# Patient Record
Sex: MALE | Race: WHITE | NOT HISPANIC OR LATINO | Employment: OTHER | URBAN - METROPOLITAN AREA
[De-identification: names, ages, dates, MRNs, and addresses within clinical notes are randomized per-mention and may not be internally consistent; named-entity substitution may affect disease eponyms.]

---

## 2017-06-02 ENCOUNTER — TELEPHONE (OUTPATIENT)
Dept: HEPATOLOGY | Facility: CLINIC | Age: 52
End: 2017-06-02

## 2017-06-02 DIAGNOSIS — B18.2 CHRONIC HEPATITIS C WITHOUT HEPATIC COMA: Primary | ICD-10-CM

## 2017-06-02 DIAGNOSIS — K74.69 OTHER CIRRHOSIS OF LIVER: ICD-10-CM

## 2017-06-02 NOTE — TELEPHONE ENCOUNTER
----- Message from Le Lopez sent at 6/2/2017 11:39 AM CDT -----  Contact: patient   Calling to schedule appt please call  [phone]

## 2017-06-02 NOTE — TELEPHONE ENCOUNTER
Chart reviewed.  He was treated with solvaldi and ribavirin x12 weeks, completed treatment on 9/30/14. Pt relapsed. Pt was last seen by Karen on 10/25/15. He is overdue for HCC screening and f/u.    Several attempts were made to reach pt last year (he was identified on HCV registry).   Pt called today requesting appt for Hep C.   Genotype, cbc, cmp, inr, afp scheduled on 6/5. U/s, fibroscan, and clinic visit scheduled on 6/23. Reminders mailed.

## 2017-06-07 ENCOUNTER — TELEPHONE (OUTPATIENT)
Dept: HEPATOLOGY | Facility: CLINIC | Age: 52
End: 2017-06-07

## 2017-06-07 NOTE — TELEPHONE ENCOUNTER
Pt did not have labs done on 6/5 as scheduled. Attempted to speak with him. Left messages requesting pt call back to reschedule asap.

## 2017-06-14 ENCOUNTER — LAB VISIT (OUTPATIENT)
Dept: LAB | Facility: HOSPITAL | Age: 52
End: 2017-06-14
Attending: INTERNAL MEDICINE
Payer: COMMERCIAL

## 2017-06-14 DIAGNOSIS — E11.9 TYPE 2 DIABETES MELLITUS WITHOUT COMPLICATION: ICD-10-CM

## 2017-06-14 DIAGNOSIS — B18.2 CHRONIC HEPATITIS C WITHOUT HEPATIC COMA: ICD-10-CM

## 2017-06-14 DIAGNOSIS — K74.69 OTHER CIRRHOSIS OF LIVER: ICD-10-CM

## 2017-06-14 LAB
AFP SERPL-MCNC: 4.2 NG/ML
ALBUMIN SERPL BCP-MCNC: 3.5 G/DL
ALP SERPL-CCNC: 110 U/L
ALT SERPL W/O P-5'-P-CCNC: 83 U/L
ANION GAP SERPL CALC-SCNC: 8 MMOL/L
AST SERPL-CCNC: 53 U/L
BASOPHILS # BLD AUTO: 0.04 K/UL
BASOPHILS NFR BLD: 0.5 %
BILIRUB DIRECT SERPL-MCNC: 0.3 MG/DL
BILIRUB SERPL-MCNC: 0.5 MG/DL
BUN SERPL-MCNC: 13 MG/DL
CALCIUM SERPL-MCNC: 9.2 MG/DL
CHLORIDE SERPL-SCNC: 102 MMOL/L
CO2 SERPL-SCNC: 27 MMOL/L
CREAT SERPL-MCNC: 0.8 MG/DL
DIFFERENTIAL METHOD: NORMAL
EOSINOPHIL # BLD AUTO: 0.2 K/UL
EOSINOPHIL NFR BLD: 2.6 %
ERYTHROCYTE [DISTWIDTH] IN BLOOD BY AUTOMATED COUNT: 12.4 %
EST. GFR  (AFRICAN AMERICAN): >60 ML/MIN/1.73 M^2
EST. GFR  (NON AFRICAN AMERICAN): >60 ML/MIN/1.73 M^2
ESTIMATED AVG GLUCOSE: 306 MG/DL
GGT SERPL-CCNC: 100 U/L
GLUCOSE SERPL-MCNC: 337 MG/DL
HAPTOGLOB SERPL-MCNC: 69 MG/DL
HBA1C MFR BLD HPLC: 12.3 %
HCT VFR BLD AUTO: 43.2 %
HGB BLD-MCNC: 14.8 G/DL
INR PPP: 1.2
LYMPHOCYTES # BLD AUTO: 2.3 K/UL
LYMPHOCYTES NFR BLD: 26.3 %
MCH RBC QN AUTO: 30.4 PG
MCHC RBC AUTO-ENTMCNC: 34.3 %
MCV RBC AUTO: 89 FL
MONOCYTES # BLD AUTO: 0.9 K/UL
MONOCYTES NFR BLD: 9.8 %
NEUTROPHILS # BLD AUTO: 5.3 K/UL
NEUTROPHILS NFR BLD: 60.3 %
PLATELET # BLD AUTO: 160 K/UL
PMV BLD AUTO: 11.9 FL
POTASSIUM SERPL-SCNC: 4.4 MMOL/L
PROT SERPL-MCNC: 6.9 G/DL
PROTHROMBIN TIME: 12.4 SEC
RBC # BLD AUTO: 4.87 M/UL
SODIUM SERPL-SCNC: 137 MMOL/L
WBC # BLD AUTO: 8.75 K/UL

## 2017-06-14 PROCEDURE — 82977 ASSAY OF GGT: CPT

## 2017-06-14 PROCEDURE — 87902 NFCT AGT GNTYP ALYS HEP C: CPT

## 2017-06-14 PROCEDURE — 80053 COMPREHEN METABOLIC PANEL: CPT

## 2017-06-14 PROCEDURE — 85610 PROTHROMBIN TIME: CPT

## 2017-06-14 PROCEDURE — 82248 BILIRUBIN DIRECT: CPT

## 2017-06-14 PROCEDURE — 83036 HEMOGLOBIN GLYCOSYLATED A1C: CPT

## 2017-06-14 PROCEDURE — 87522 HEPATITIS C REVRS TRNSCRPJ: CPT

## 2017-06-14 PROCEDURE — 82105 ALPHA-FETOPROTEIN SERUM: CPT

## 2017-06-14 PROCEDURE — 83010 ASSAY OF HAPTOGLOBIN QUANT: CPT

## 2017-06-14 PROCEDURE — 85025 COMPLETE CBC W/AUTO DIFF WBC: CPT

## 2017-06-16 ENCOUNTER — TELEPHONE (OUTPATIENT)
Dept: HEPATOLOGY | Facility: CLINIC | Age: 52
End: 2017-06-16

## 2017-06-16 LAB
HCV GENTYP SERPL NAA+PROBE: ABNORMAL
HCV QUALITATIVE RESULT: DETECTED
HCV QUANTITATIVE LOG: 6.11 LOG (10) IU/ML
HCV RNA SPEC NAA+PROBE-ACNC: ABNORMAL IU/ML

## 2017-06-19 ENCOUNTER — OFFICE VISIT (OUTPATIENT)
Dept: INTERNAL MEDICINE | Facility: CLINIC | Age: 52
End: 2017-06-19
Payer: COMMERCIAL

## 2017-06-19 VITALS
WEIGHT: 187.31 LBS | HEIGHT: 71 IN | DIASTOLIC BLOOD PRESSURE: 84 MMHG | SYSTOLIC BLOOD PRESSURE: 120 MMHG | HEART RATE: 89 BPM | BODY MASS INDEX: 26.22 KG/M2

## 2017-06-19 DIAGNOSIS — Z12.5 SPECIAL SCREENING FOR MALIGNANT NEOPLASM OF PROSTATE: ICD-10-CM

## 2017-06-19 DIAGNOSIS — R35.0 URINARY FREQUENCY: ICD-10-CM

## 2017-06-19 DIAGNOSIS — E11.65 UNCONTROLLED TYPE 2 DIABETES MELLITUS WITH HYPERGLYCEMIA, WITH LONG-TERM CURRENT USE OF INSULIN: ICD-10-CM

## 2017-06-19 DIAGNOSIS — Z00.00 HEALTH CARE MAINTENANCE: Primary | ICD-10-CM

## 2017-06-19 DIAGNOSIS — Z97.3 WEARS GLASSES: ICD-10-CM

## 2017-06-19 DIAGNOSIS — Z79.4 UNCONTROLLED TYPE 2 DIABETES MELLITUS WITH HYPERGLYCEMIA, WITH LONG-TERM CURRENT USE OF INSULIN: ICD-10-CM

## 2017-06-19 DIAGNOSIS — K63.5 POLYP OF COLON, UNSPECIFIED PART OF COLON, UNSPECIFIED TYPE: ICD-10-CM

## 2017-06-19 LAB
BACTERIA #/AREA URNS AUTO: NORMAL /HPF
BILIRUB UR QL STRIP: NEGATIVE
CLARITY UR REFRACT.AUTO: CLEAR
COLOR UR AUTO: YELLOW
CREAT UR-MCNC: 36 MG/DL
GLUCOSE UR QL STRIP: ABNORMAL
HGB UR QL STRIP: NEGATIVE
KETONES UR QL STRIP: NEGATIVE
LEUKOCYTE ESTERASE UR QL STRIP: NEGATIVE
MICROALBUMIN UR DL<=1MG/L-MCNC: <2.5 UG/ML
MICROALBUMIN/CREATININE RATIO: NORMAL UG/MG
MICROSCOPIC COMMENT: NORMAL
NITRITE UR QL STRIP: NEGATIVE
PH UR STRIP: 7 [PH] (ref 5–8)
PROT UR QL STRIP: NEGATIVE
SP GR UR STRIP: 1.03 (ref 1–1.03)
URN SPEC COLLECT METH UR: ABNORMAL
UROBILINOGEN UR STRIP-ACNC: NEGATIVE EU/DL
WBC #/AREA URNS AUTO: 0 /HPF (ref 0–5)
YEAST UR QL AUTO: NORMAL

## 2017-06-19 PROCEDURE — 82570 ASSAY OF URINE CREATININE: CPT

## 2017-06-19 PROCEDURE — 99999 PR PBB SHADOW E&M-EST. PATIENT-LVL V: CPT | Mod: PBBFAC,,, | Performed by: INTERNAL MEDICINE

## 2017-06-19 PROCEDURE — 87086 URINE CULTURE/COLONY COUNT: CPT

## 2017-06-19 PROCEDURE — 99396 PREV VISIT EST AGE 40-64: CPT | Mod: S$GLB,,, | Performed by: INTERNAL MEDICINE

## 2017-06-19 PROCEDURE — 81001 URINALYSIS AUTO W/SCOPE: CPT

## 2017-06-19 RX ORDER — INSULIN GLARGINE 100 [IU]/ML
15 INJECTION, SOLUTION SUBCUTANEOUS DAILY
Qty: 5 ML | Refills: 6 | Status: SHIPPED | OUTPATIENT
Start: 2017-06-19 | End: 2017-09-19 | Stop reason: SDUPTHER

## 2017-06-19 NOTE — PROGRESS NOTES
"Subjective:       Patient ID: Nicholas Marshall is a 52 y.o. male.    Chief Complaint: Annual Exam    HPI   51 yo M last seen by me in 2015 presents requesting annual exam (scheduled 20 min.)    DM - a1c 12.3% earlier this month.    Failed metformin previously.   Previously saw NP Alessandro but not since 2015.   Down to 187 lbs from 310#.   Previous trouble affording lantus.   Previous:  Lantus 25 units twice daily, Humalog 18 units plus correction with goal 180, ISF 25.   Still having quite high readings.  He has been having trouble affording the Lantus.  No insulin filled here since 2015.   Reports yesterday was 208.  He is active, riding bike. Has done well with weight loss.      He is rarely taking lantus - reports on sliding scare? He reports he has not taken it in about 10 days. It was last 15u daily.   humalog on sliding scale.   HCV VL 1.2mil. Dr. Charles.       Review of Systems   Constitutional: Negative for fever.   Respiratory: Negative for shortness of breath.    Cardiovascular: Negative for chest pain.   Genitourinary: Positive for dysuria and frequency.   Musculoskeletal: Negative.    Skin: Negative.        Objective:   /84   Pulse 89   Ht 5' 11" (1.803 m)   Wt 85 kg (187 lb 4.8 oz)   BMI 26.12 kg/m²      Physical Exam   Constitutional: He is oriented to person, place, and time. He appears well-developed and well-nourished. No distress.   HENT:   Head: Normocephalic and atraumatic.   Cardiovascular: Normal rate and regular rhythm.    No murmur heard.  Pulmonary/Chest: Effort normal. No respiratory distress. He has no wheezes. He has no rales.   Neurological: He is alert and oriented to person, place, and time.   Skin: Skin is warm and dry. He is not diaphoretic.   tattoos   Psychiatric: He has a normal mood and affect. His behavior is normal.     Protective Sensation (w/ 10 gram monofilament):  Right: Intact  Left: Intact    Visual Inspection:  Normal -  Bilateral x left great toe " ingrown nail that has been cut, healing well    Pedal Pulses:   Right: Present  Left: Present    Posterior tibialis:   Right:Present  Left: Present      Assessment:       1. Health care maintenance    2. Uncontrolled diabetes mellitus type 2 without complications, unspecified long term insulin use status    3. Wears glasses    4. Urinary frequency    5. Special screening for malignant neoplasm of prostate    6. Polyp of colon, unspecified part of colon, unspecified type    7. Uncontrolled type 2 diabetes mellitus with hyperglycemia, with long-term current use of insulin        Plan:       Nicholas was seen today for annual exam.    Diagnoses and all orders for this visit:    Health care maintenance  -     Case request GI: COLONOSCOPY    Uncontrolled type 2 diabetes mellitus with hyperglycemia, with long-term current use of insulin  -     blood sugar diagnostic (BLOOD GLUCOSE TEST) Strp; Pt test BG 4x/day  -     RESTART insulin glargine (LANTUS SOLOSTAR) 100 unit/mL (3 mL) InPn pen; Inject 15 Units into the skin once daily.  Continue humalog ssi ac tid  Bring bg log and insulin doses to upcoming endocrine appt  -     acetone, urine, test (KETONE URINE TEST) Strp; 1 strip by Misc.(Non-Drug; Combo Route) route daily as needed (high blood sugar).  -     Ambulatory Referral to Pharmacy Assistance  -     Microalbumin/creatinine urine ratio  -     Ambulatory referral to Optometry  -     Lipid panel; Future    Wears glasses  -     Ambulatory referral to Optometry    Urinary frequency  -     Urinalysis  -     Urine culture    Special screening for malignant neoplasm of prostate  -     PSA, Screening; Future    Polyp of colon, unspecified part of colon, unspecified type  -     Case request GI: COLONOSCOPY          eye exam Jan 2017 at [facility] - wants to re-establish here

## 2017-06-20 ENCOUNTER — TELEPHONE (OUTPATIENT)
Dept: PULMONOLOGY | Facility: CLINIC | Age: 52
End: 2017-06-20

## 2017-06-21 LAB — BACTERIA UR CULT: NO GROWTH

## 2017-06-23 ENCOUNTER — CLINICAL SUPPORT (OUTPATIENT)
Dept: INFECTIOUS DISEASES | Facility: CLINIC | Age: 52
End: 2017-06-23
Payer: COMMERCIAL

## 2017-06-23 ENCOUNTER — HOSPITAL ENCOUNTER (OUTPATIENT)
Dept: RADIOLOGY | Facility: HOSPITAL | Age: 52
Discharge: HOME | End: 2017-06-23
Attending: INTERNAL MEDICINE
Payer: COMMERCIAL

## 2017-06-23 ENCOUNTER — OFFICE VISIT (OUTPATIENT)
Dept: HEPATOLOGY | Facility: CLINIC | Age: 52
End: 2017-06-23
Payer: COMMERCIAL

## 2017-06-23 VITALS
DIASTOLIC BLOOD PRESSURE: 96 MMHG | HEART RATE: 97 BPM | TEMPERATURE: 97 F | SYSTOLIC BLOOD PRESSURE: 135 MMHG | WEIGHT: 188.94 LBS | HEIGHT: 71 IN | BODY MASS INDEX: 26.45 KG/M2 | RESPIRATION RATE: 18 BRPM | OXYGEN SATURATION: 95 %

## 2017-06-23 DIAGNOSIS — B18.2 CHRONIC HEPATITIS C WITHOUT HEPATIC COMA: ICD-10-CM

## 2017-06-23 DIAGNOSIS — B18.2 CHRONIC HEPATITIS C WITHOUT HEPATIC COMA: Primary | ICD-10-CM

## 2017-06-23 DIAGNOSIS — K74.69 OTHER CIRRHOSIS OF LIVER: ICD-10-CM

## 2017-06-23 PROBLEM — K74.60 HEPATIC CIRRHOSIS: Status: ACTIVE | Noted: 2017-06-23

## 2017-06-23 PROCEDURE — 90471 IMMUNIZATION ADMIN: CPT | Mod: S$GLB,,, | Performed by: INTERNAL MEDICINE

## 2017-06-23 PROCEDURE — 76700 US EXAM ABDOM COMPLETE: CPT | Mod: TC

## 2017-06-23 PROCEDURE — 76700 US EXAM ABDOM COMPLETE: CPT | Mod: 26,,, | Performed by: RADIOLOGY

## 2017-06-23 PROCEDURE — 99214 OFFICE O/P EST MOD 30 MIN: CPT | Mod: S$GLB,,, | Performed by: PHYSICIAN ASSISTANT

## 2017-06-23 PROCEDURE — 99999 PR PBB SHADOW E&M-EST. PATIENT-LVL IV: CPT | Mod: PBBFAC,,, | Performed by: PHYSICIAN ASSISTANT

## 2017-06-23 PROCEDURE — 99999 PR PBB SHADOW E&M-EST. PATIENT-LVL I: CPT | Mod: PBBFAC,,,

## 2017-06-23 PROCEDURE — 90636 HEP A/HEP B VACC ADULT IM: CPT | Mod: S$GLB,,, | Performed by: INTERNAL MEDICINE

## 2017-06-23 NOTE — LETTER
June 23, 2017      Karen Valencia, NP  [address]  [facility] [address]           [facility]  [address]  [facility] [address]  Phone: [phone]  Fax: [phone]          Patient: Nicholas Marshall   MR Number: [id number]   YOB: 1965   Date of Visit: 6/23/2017       Dear Karen Valencia:    Thank you for referring Nicholas Marshall to me for evaluation. Attached you will find relevant portions of my assessment and plan of care.    If you have questions, please do not hesitate to call me. I look forward to following Nicholas Marshall along with you.    Sincerely,    CHANDLER Maldonadoosure  CC:  No Recipients    If you would like to receive this communication electronically, please contact [email or web address] or [phone] to request more information on [employer] access.    For providers and/or their staff who would like to refer a patient to [facility], please contact us through our one-stop-shop provider referral line, [facility], at [phone].    If you feel you have received this communication in error or would no longer like to receive these types of communications, please e-mail [email or web address]

## 2017-06-23 NOTE — PROGRESS NOTES
HEPATOLOGY CLINIC VISIT NOTE    REFERRING PROVIDER:    CHIEF COMPLAINT: Hepatitis C    HISTORY: Patient is a 52 y.o. WM who was identified on the HCV registry.  He is new to me.  Multiple attempts were made to reach him last year by staff, as was also overdue for HCC screening.  Just reastablished with PCP here.   Previously followed by Karen Guardado NP, but last seen .   He has chronic hepatitis C Genotype 2a/c w/ viral load of 1.3million IU/mL and well compensated cirrhosis.  He was treated w/ 12 weeks of  sovaldi / Ribavirin but unfortunately relapsed back late . Recent labs show normal cbc w/ platelets 160K, Transaminases elevated and fairly well preserved synthetic liver function. MELD = 8 based on 2017 labs    hcc screen:   AFP normal at 4.2  2017 Abd US = hepatosplenomegaly.  No steatosis, cholecstectomy, simple right renal cyst (stable). Spleen = 15.1cm    No EGD noted in epic    Stagin-2013 HCVFibrosure = F4 Cirrhosis   Splenomegaly on imaging     Past hcv treatment:   He reports he was compliant w/ treatment in the past, denies recalling any significant side effects and tolerated quite well.   Completed 12 weeks sof + rbv late .       Pt reports significant  wt loss, went from 310 to 185 pounds 1.5 years.   Appetite down. Seeing PCP and has started workup for this.     Pt has no other complaints today and feels well he denies fevers,  lymphadenopathy,  Rashes, dark urine, abdominal fluid accumulation, yellowing/jaundice of skin or eyes, vomiting of blood, black stool, confusion or  tremors.       Past Medical History:   Diagnosis Date    Diabetes mellitus     Diabetes mellitus type 2 in nonobese 2013    Hepatitis C virus infection 2013    Hypertension     Obesity 2013    Intentional 75 ib weight loss    Splenomegaly      Social history:    . [employer] around     FHX:   No fhx of liver cancer or liver disease.     ALLERGIES AND  MEDICATIONS: reviewed in epic    ROS:   General: denies fever, chills, weight change, fatigue   Skin: denies rash, itching, sores  HEENT: + headaches, + h/o migraines   Respiratory: denies SOB, wheeze, cough, sputum, hemoptysis  CV: denies chest pain or palpatations  GI: no heartburn, no nausea, vomiting, diarrhea,  hematechezia, jaundice,   hematemesis,  denies icteric illness, abdominal swelling, confusion.   : denies dark urine  Musculoskeletal: denies muscle weakness, + joint pains.     PE:  WDWN, NAD  Alert, oriented and pleasant.   Vitals:    06/23/17 0915   BP: (!) 135/96   Pulse: 97   Resp: 18   Temp: 96.7 °F (35.9 °C)   HEENT: ncat, eomi, no scleral icterus; normal rom of neck, no lymphadenopathy  Heart: regular rate and rhythm  Lungs: clear bilaterally, chest symmetric with respirations. No wheezes rhonchi or rales.   Abdomen: + bs, ntnd. No organomegaly. No masses palpable   Neuro/psych: no asterixis, oriented x3, mood and affect appropriate.   Skin: warm and dry, no c/c/e. +  ordaz erythema.  +  spider telangectasia on chest     RECENT LABS:  Results for DENNIS MARR (MRN [id number]) as of 6/23/2017 10:28   Ref. Range 6/14/2017 07:10   HCV Qualitative Result Latest Ref Range: Not detected  DETECTED (A)   HCV Quantitative Log Latest Ref Range: <1.08 Log (10) IU/mL 6.11 (H)   HCV Quantitative Result Latest Ref Range: <12 IU/mL 1,295,339 (H)   Hepatitis C Virus Genotype Unknown 2a/c (A)       Hepatitis A Antibody IgG   Date Value Ref Range Status   09/16/2013 Negative  Final       Hepatitis B Surface Ag   Date Value Ref Range Status   09/16/2013 Negative  Final     Hep B Core Total Ab   Date Value Ref Range Status   09/16/2013 Negative  Final     Hep B S Ab   Date Value Ref Range Status   09/16/2013 Negative  Final     Immunization History   Administered Date(s) Administered    Influenza Split 01/22/2014    Pneumococcal Polysaccharide - 23 Valent 01/22/2014    influenza - Quadrivalent  12/07/2015     Lab Results   Component Value Date    WBC 8.75 06/14/2017    HGB 14.8 06/14/2017     06/14/2017    AST 53 (H) 06/14/2017    ALT 83 (H) 06/14/2017    CREATININE 0.8 06/14/2017    BILITOT 0.5 06/14/2017    INR 1.2 06/14/2017    AFP 4.2 06/14/2017     MELD-Na score: 8 at 6/14/2017  7:10 AM  MELD score: 8 at 6/14/2017  7:10 AM  Calculated from:  Serum Creatinine: 0.8 mg/dL (Rounded to 1) at 6/14/2017  7:10 AM  Serum Sodium: 137 mmol/L at 6/14/2017  7:10 AM  Total Bilirubin: 0.5 mg/dL (Rounded to 1) at 6/14/2017  7:10 AM  INR(ratio): 1.2 at 6/14/2017  7:10 AM  Age: 52 years    ASSESSMENT:  51yo WM w/   1. CHRONIC HEPATITIS C, GENOTYPE 2a/c  Relapsed 12 wks of sovaldi and ribavirin (therapy completed )   elevated transaminases.    Lacks immunity to Hepatitis A and B.    2. Cirrhosis, well compensated  2013 HCVFibrosure = F4 Cirrhosis  Splenomegaly  MELD = 8  6-2017 US = no liver lesions, AFP normal   Had a lapse in hcc screening but now up to date (6-2017)                    EDUCATION DISCUSSION    Per current AASLD for   Genotype 2 Sofosbuvir plus Ribavirin Treatment-Experienced Patients - Recommended  Recommended regimens are listed in groups by level of evidence, then alphabetically.     Daily daclatasvir (60 mg*) plus sofosbuvir (400 mg) with or without weight-based ribavirin for 24 weeks is a Recommended regimen for patients with HCV genotype 2 infection, regardless of cirrhosis status, in whom prior treatment with sofosbuvir and ribavirin has failed.  Rating: Class IIa, Level C     Daily fixed-dose combination of sofosbuvir (400 mg)/velpatasvir (100 mg) with weight-based ribavirin for 12 weeks is a Recommended regimen for patients with HCV genotype 2 infection, regardless of cirrhosis status, in whom prior treatment with sofosbuvir and ribavirin has failed.  Rating: Class IIa, Level C    re few data available to guide therapy in patients with HCV genotype 2 infection in whom prior  treatment with sofosbuvir and ribavirin has failed. Prior studies of genotype 1 or 3 treatment failures have shown that adding ribavirin to sofosbuvir/velpatasvir leads to higher cure rates than just sofosbuvir/velpatasvir alone (Nohemi, 2015).   Extrapolating from this study, the addition of ribavirin is recommended.    The combination of daclatasvir and sofosbuvir is effective in patients with HCV genotype 2 infection, but there are limited data about this therapy in treatment-experienced patients with HCV genotype 2 infection (Farzaneh, 2014a); (Carl, 2015). For patients in whom prior treatment with sofosbuvir and ribavirin failed who are ribavirin ineligible, the decision to treat with daclatasvir and sofosbuvir should be made on an individual patient basis with consideration of extension of therapy to 24 weeks with the addition of ribavirin, especially in difficult-to-treat patients such as those with cirrhosis.    Reviewed the information above and limited data on current regimens for his retreatment, i do recommend he consider the options.  Given that his cirrhosis is well compensated i think  he would do well with retreatment with either above regimen. However  i can not give exact SVR rates.   He's reluctant to proceed.     Reviewed the basics about liver fibrosis /scarring and how that relates to liver function.   Signs and symptoms of hepatic decompensation were reviewed, including jaundice, ascites, and slowed mentation due to hepatic encephalopathy. The patient should seek medical attention if any of these things occur.   We discussed the potential for bleeding from esophageal varices with symptoms of hematemesis and melena.   The patient should report to the Emergency Department for these symptoms.     We discussed the increased risk of hepatocellular carcinoma due to cirrhosis.   Continued screening every six months with ultrasound and AFP is recommended.   Discussed recommendation for varices  screening w/ EGD.   Discussed the general recommendations for liver health-- continue to avoid raw seafood, limit tylenol to 2000mg daily and avoid alcohol     PLAN:  Vaccines for Hepatitis A and B  Recommended  Continue HCC screening every 6 months lifelong    Recommend EGD, discussed rational , ordered    Continue f/u with PCP re: wt loss   If reconsiders retreatment w/ Epclusa + RBV, pls call for appt sooner. Or we can weigh against new options we may have in 6 months.   F/U appt: 6 months w/ US & labs prior to visit

## 2017-06-28 ENCOUNTER — TELEPHONE (OUTPATIENT)
Dept: INTERNAL MEDICINE | Facility: CLINIC | Age: 52
End: 2017-06-28

## 2017-06-28 DIAGNOSIS — E78.5 HYPERLIPIDEMIA ASSOCIATED WITH TYPE 2 DIABETES MELLITUS: ICD-10-CM

## 2017-06-28 DIAGNOSIS — E11.69 HYPERLIPIDEMIA ASSOCIATED WITH TYPE 2 DIABETES MELLITUS: ICD-10-CM

## 2017-06-28 NOTE — TELEPHONE ENCOUNTER
Please call pt.     Your cholesterol levels are above goal.  Goal LDL (bad cholesterol) is under 100.     I would like you to work on your diet. Try to minimize high cholesterol foods such as red meats, egg yolks, and fried foods.     We will recheck your cholesterol in 3 months and if your levels are still at that point we can talk about starting a medication. If you would like to start medication now please let me know.     Schedule fasting labs in three months.

## 2017-06-29 NOTE — TELEPHONE ENCOUNTER
Attempted to contact pt to give results, cell not working left message with sister to call the office back.

## 2017-06-30 RX ORDER — LANCETS
EACH MISCELLANEOUS
Qty: 150 EACH | Refills: 4 | Status: SHIPPED | OUTPATIENT
Start: 2017-06-30

## 2017-07-13 NOTE — TELEPHONE ENCOUNTER
Per [employer]- Pt would like to have strips filled through their mail order pharmacy.     Refill request pended to Dr. Ortiz for authorization.

## 2017-07-31 ENCOUNTER — CLINICAL SUPPORT (OUTPATIENT)
Dept: INFECTIOUS DISEASES | Facility: CLINIC | Age: 52
End: 2017-07-31
Payer: COMMERCIAL

## 2017-07-31 DIAGNOSIS — K74.69 OTHER CIRRHOSIS OF LIVER: ICD-10-CM

## 2017-07-31 DIAGNOSIS — B18.2 CHRONIC HEPATITIS C WITHOUT HEPATIC COMA: ICD-10-CM

## 2017-07-31 PROCEDURE — 90636 HEP A/HEP B VACC ADULT IM: CPT | Mod: S$GLB,,, | Performed by: INTERNAL MEDICINE

## 2017-07-31 PROCEDURE — 90471 IMMUNIZATION ADMIN: CPT | Mod: S$GLB,,, | Performed by: INTERNAL MEDICINE

## 2017-07-31 PROCEDURE — 99999 PR PBB SHADOW E&M-EST. PATIENT-LVL I: CPT | Mod: PBBFAC,,,

## 2017-09-05 ENCOUNTER — TELEPHONE (OUTPATIENT)
Dept: HEPATOLOGY | Facility: CLINIC | Age: 52
End: 2017-09-05

## 2017-09-05 NOTE — TELEPHONE ENCOUNTER
----- Message from Tara Coello MA sent at 9/5/2017  2:27 PM CDT -----  Contact: Pt      ----- Message -----  From: Monica Wan  Sent: 9/5/2017  11:45 AM  To: [facility] Hepat Non-Clinical Staff    Pt would like to schedule labs, ultrasound & an appt with Evelia Cherry for treatment follow up    Contact number [phone] if pt doesn't answer you can leave a message with his sister Tarsha    Ingris

## 2017-09-13 ENCOUNTER — HOSPITAL ENCOUNTER (INPATIENT)
Facility: HOSPITAL | Age: 52
LOS: 4 days | Discharge: HOME | DRG: 345 | End: 2017-09-17
Attending: EMERGENCY MEDICINE | Admitting: INTERNAL MEDICINE
Payer: COMMERCIAL

## 2017-09-13 DIAGNOSIS — K61.1 RECTAL ABSCESS: Primary | ICD-10-CM

## 2017-09-13 DIAGNOSIS — B18.2 CHRONIC HEPATITIS C WITHOUT HEPATIC COMA: ICD-10-CM

## 2017-09-13 LAB
ALBUMIN SERPL BCP-MCNC: 3.4 G/DL
ALP SERPL-CCNC: 97 U/L
ALT SERPL W/O P-5'-P-CCNC: 142 U/L
ANION GAP SERPL CALC-SCNC: 9 MMOL/L
AST SERPL-CCNC: 92 U/L
BACTERIA #/AREA URNS HPF: NORMAL /HPF
BASOPHILS # BLD AUTO: 0.02 K/UL
BASOPHILS NFR BLD: 0.2 %
BILIRUB SERPL-MCNC: 1 MG/DL
BILIRUB UR QL STRIP: NEGATIVE
BUN SERPL-MCNC: 7 MG/DL
CALCIUM SERPL-MCNC: 9.3 MG/DL
CHLORIDE SERPL-SCNC: 102 MMOL/L
CLARITY UR: CLEAR
CO2 SERPL-SCNC: 26 MMOL/L
COLOR UR: YELLOW
CREAT SERPL-MCNC: 0.9 MG/DL
DIFFERENTIAL METHOD: ABNORMAL
EOSINOPHIL # BLD AUTO: 0.1 K/UL
EOSINOPHIL NFR BLD: 0.9 %
ERYTHROCYTE [DISTWIDTH] IN BLOOD BY AUTOMATED COUNT: 12.5 %
EST. GFR  (AFRICAN AMERICAN): >60 ML/MIN/1.73 M^2
EST. GFR  (NON AFRICAN AMERICAN): >60 ML/MIN/1.73 M^2
GLUCOSE SERPL-MCNC: 328 MG/DL
GLUCOSE UR QL STRIP: ABNORMAL
HCT VFR BLD AUTO: 41.2 %
HGB BLD-MCNC: 14.5 G/DL
HGB UR QL STRIP: NEGATIVE
KETONES UR QL STRIP: NEGATIVE
LACTATE SERPL-SCNC: 0.9 MMOL/L
LEUKOCYTE ESTERASE UR QL STRIP: NEGATIVE
LYMPHOCYTES # BLD AUTO: 1.9 K/UL
LYMPHOCYTES NFR BLD: 17.8 %
MCH RBC QN AUTO: 30.7 PG
MCHC RBC AUTO-ENTMCNC: 35.2 G/DL
MCV RBC AUTO: 87 FL
MICROSCOPIC COMMENT: NORMAL
MONOCYTES # BLD AUTO: 1 K/UL
MONOCYTES NFR BLD: 9.5 %
NEUTROPHILS # BLD AUTO: 7.7 K/UL
NEUTROPHILS NFR BLD: 71.2 %
NITRITE UR QL STRIP: NEGATIVE
PH UR STRIP: 6 [PH] (ref 5–8)
PLATELET # BLD AUTO: 178 K/UL
PMV BLD AUTO: 11.4 FL
POCT GLUCOSE: 267 MG/DL (ref 70–110)
POCT GLUCOSE: 304 MG/DL (ref 70–110)
POTASSIUM SERPL-SCNC: 3.6 MMOL/L
PROT SERPL-MCNC: 7.1 G/DL
PROT UR QL STRIP: NEGATIVE
RBC # BLD AUTO: 4.73 M/UL
SODIUM SERPL-SCNC: 137 MMOL/L
SP GR UR STRIP: >1.03 (ref 1–1.03)
URN SPEC COLLECT METH UR: ABNORMAL
UROBILINOGEN UR STRIP-ACNC: NEGATIVE EU/DL
WBC # BLD AUTO: 10.78 K/UL
YEAST URNS QL MICRO: NORMAL

## 2017-09-13 PROCEDURE — 63600175 PHARM REV CODE 636 W HCPCS: Performed by: PHYSICIAN ASSISTANT

## 2017-09-13 PROCEDURE — 96375 TX/PRO/DX INJ NEW DRUG ADDON: CPT

## 2017-09-13 PROCEDURE — 12000002 HC ACUTE/MED SURGE SEMI-PRIVATE ROOM

## 2017-09-13 PROCEDURE — 82962 GLUCOSE BLOOD TEST: CPT

## 2017-09-13 PROCEDURE — 85025 COMPLETE CBC W/AUTO DIFF WBC: CPT

## 2017-09-13 PROCEDURE — 96376 TX/PRO/DX INJ SAME DRUG ADON: CPT

## 2017-09-13 PROCEDURE — 80053 COMPREHEN METABOLIC PANEL: CPT

## 2017-09-13 PROCEDURE — 25500020 PHARM REV CODE 255: Performed by: EMERGENCY MEDICINE

## 2017-09-13 PROCEDURE — 25000003 PHARM REV CODE 250: Performed by: PHYSICIAN ASSISTANT

## 2017-09-13 PROCEDURE — 83605 ASSAY OF LACTIC ACID: CPT

## 2017-09-13 PROCEDURE — S0028 INJECTION, FAMOTIDINE, 20 MG: HCPCS | Performed by: PHYSICIAN ASSISTANT

## 2017-09-13 PROCEDURE — 96374 THER/PROPH/DIAG INJ IV PUSH: CPT

## 2017-09-13 PROCEDURE — 87040 BLOOD CULTURE FOR BACTERIA: CPT

## 2017-09-13 PROCEDURE — 99285 EMERGENCY DEPT VISIT HI MDM: CPT | Mod: 25

## 2017-09-13 PROCEDURE — 81000 URINALYSIS NONAUTO W/SCOPE: CPT

## 2017-09-13 RX ORDER — INSULIN ASPART 100 [IU]/ML
1-10 INJECTION, SOLUTION INTRAVENOUS; SUBCUTANEOUS EVERY 6 HOURS PRN
Status: DISCONTINUED | OUTPATIENT
Start: 2017-09-13 | End: 2017-09-14

## 2017-09-13 RX ORDER — ONDANSETRON 2 MG/ML
4 INJECTION INTRAMUSCULAR; INTRAVENOUS
Status: COMPLETED | OUTPATIENT
Start: 2017-09-13 | End: 2017-09-13

## 2017-09-13 RX ORDER — FAMOTIDINE 10 MG/ML
20 INJECTION INTRAVENOUS EVERY 12 HOURS
Status: DISCONTINUED | OUTPATIENT
Start: 2017-09-13 | End: 2017-09-14

## 2017-09-13 RX ORDER — IBUPROFEN 400 MG/1
400 TABLET ORAL EVERY 6 HOURS PRN
Status: DISCONTINUED | OUTPATIENT
Start: 2017-09-13 | End: 2017-09-14

## 2017-09-13 RX ORDER — HYDROMORPHONE HYDROCHLORIDE 2 MG/ML
1 INJECTION, SOLUTION INTRAMUSCULAR; INTRAVENOUS; SUBCUTANEOUS EVERY 4 HOURS PRN
Status: DISCONTINUED | OUTPATIENT
Start: 2017-09-13 | End: 2017-09-16

## 2017-09-13 RX ORDER — ONDANSETRON 2 MG/ML
4 INJECTION INTRAMUSCULAR; INTRAVENOUS EVERY 12 HOURS PRN
Status: DISCONTINUED | OUTPATIENT
Start: 2017-09-13 | End: 2017-09-14

## 2017-09-13 RX ORDER — HYDROMORPHONE HYDROCHLORIDE 2 MG/ML
0.5 INJECTION, SOLUTION INTRAMUSCULAR; INTRAVENOUS; SUBCUTANEOUS EVERY 4 HOURS PRN
Status: DISCONTINUED | OUTPATIENT
Start: 2017-09-13 | End: 2017-09-14

## 2017-09-13 RX ORDER — SODIUM CHLORIDE 9 MG/ML
INJECTION, SOLUTION INTRAVENOUS CONTINUOUS
Status: DISCONTINUED | OUTPATIENT
Start: 2017-09-13 | End: 2017-09-14

## 2017-09-13 RX ORDER — GLUCAGON 1 MG
1 KIT INJECTION
Status: DISCONTINUED | OUTPATIENT
Start: 2017-09-13 | End: 2017-09-17 | Stop reason: HOSPADM

## 2017-09-13 RX ORDER — MORPHINE SULFATE 10 MG/ML
4 INJECTION INTRAMUSCULAR; INTRAVENOUS; SUBCUTANEOUS
Status: COMPLETED | OUTPATIENT
Start: 2017-09-13 | End: 2017-09-13

## 2017-09-13 RX ORDER — MORPHINE SULFATE 10 MG/ML
2 INJECTION INTRAMUSCULAR; INTRAVENOUS; SUBCUTANEOUS
Status: COMPLETED | OUTPATIENT
Start: 2017-09-13 | End: 2017-09-13

## 2017-09-13 RX ADMIN — MORPHINE SULFATE 2 MG: 10 INJECTION INTRAVENOUS at 06:09

## 2017-09-13 RX ADMIN — ONDANSETRON 4 MG: 2 INJECTION INTRAMUSCULAR; INTRAVENOUS at 06:09

## 2017-09-13 RX ADMIN — SODIUM CHLORIDE: 0.9 INJECTION, SOLUTION INTRAVENOUS at 11:09

## 2017-09-13 RX ADMIN — IOHEXOL 85 ML: 350 INJECTION, SOLUTION INTRAVENOUS at 07:09

## 2017-09-13 RX ADMIN — MORPHINE SULFATE 4 MG: 10 INJECTION INTRAVENOUS at 09:09

## 2017-09-13 RX ADMIN — IOHEXOL 15 ML: 300 INJECTION, SOLUTION INTRAVENOUS at 07:09

## 2017-09-13 RX ADMIN — VANCOMYCIN HYDROCHLORIDE 1250 MG: 1 INJECTION, POWDER, LYOPHILIZED, FOR SOLUTION INTRAVENOUS at 11:09

## 2017-09-13 RX ADMIN — FAMOTIDINE 20 MG: 10 INJECTION, SOLUTION INTRAVENOUS at 11:09

## 2017-09-13 NOTE — ED TRIAGE NOTES
Lifted something heavy on Monday and pain to left groin since Tuesday hurts more to walk and sit up long

## 2017-09-13 NOTE — LETTER
September 17, 2017         [address] [address]  Phone: [phone]  Fax: [phone]       Patient: Nichoals Marshall   YOB: 1965  Date of Visit: 09/17/2017    To Whom It May Concern:    Josette Marshall  was at [facility] on 9/13/2017 to 09/17/2017. He may return to work on 9/19/2017 with no restrictions. If you have any questions or concerns, or if I can be of further assistance, please do not hesitate to contact me.    Sincerely,    Ariana Montoya MD

## 2017-09-14 ENCOUNTER — SURGERY (OUTPATIENT)
Age: 52
End: 2017-09-14

## 2017-09-14 ENCOUNTER — ANESTHESIA EVENT (OUTPATIENT)
Dept: SURGERY | Facility: HOSPITAL | Age: 52
DRG: 345 | End: 2017-09-14
Payer: COMMERCIAL

## 2017-09-14 ENCOUNTER — ANESTHESIA (OUTPATIENT)
Dept: SURGERY | Facility: HOSPITAL | Age: 52
DRG: 345 | End: 2017-09-14
Payer: COMMERCIAL

## 2017-09-14 PROBLEM — Z72.0 TOBACCO ABUSE: Chronic | Status: ACTIVE | Noted: 2017-09-14

## 2017-09-14 PROBLEM — E44.1 MILD PROTEIN MALNUTRITION: Chronic | Status: ACTIVE | Noted: 2017-09-14

## 2017-09-14 LAB
GRAM STN SPEC: NORMAL
GRAM STN SPEC: NORMAL
POCT GLUCOSE: 259 MG/DL (ref 70–110)

## 2017-09-14 PROCEDURE — 11000001 HC ACUTE MED/SURG PRIVATE ROOM

## 2017-09-14 PROCEDURE — 71000033 HC RECOVERY, INTIAL HOUR: Performed by: SURGERY

## 2017-09-14 PROCEDURE — 87070 CULTURE OTHR SPECIMN AEROBIC: CPT

## 2017-09-14 PROCEDURE — 36000704 HC OR TIME LEV I 1ST 15 MIN: Performed by: SURGERY

## 2017-09-14 PROCEDURE — 63600175 PHARM REV CODE 636 W HCPCS: Performed by: ANESTHESIOLOGY

## 2017-09-14 PROCEDURE — D9220A PRA ANESTHESIA: Mod: ANES,,, | Performed by: ANESTHESIOLOGY

## 2017-09-14 PROCEDURE — 63600175 PHARM REV CODE 636 W HCPCS: Performed by: NURSE ANESTHETIST, CERTIFIED REGISTERED

## 2017-09-14 PROCEDURE — 63600175 PHARM REV CODE 636 W HCPCS: Performed by: PHYSICIAN ASSISTANT

## 2017-09-14 PROCEDURE — 87077 CULTURE AEROBIC IDENTIFY: CPT | Mod: 59

## 2017-09-14 PROCEDURE — 63600175 PHARM REV CODE 636 W HCPCS: Performed by: INTERNAL MEDICINE

## 2017-09-14 PROCEDURE — 87205 SMEAR GRAM STAIN: CPT

## 2017-09-14 PROCEDURE — 87075 CULTR BACTERIA EXCEPT BLOOD: CPT

## 2017-09-14 PROCEDURE — 94761 N-INVAS EAR/PLS OXIMETRY MLT: CPT

## 2017-09-14 PROCEDURE — 87186 SC STD MICRODIL/AGAR DIL: CPT | Mod: 59

## 2017-09-14 PROCEDURE — 25000003 PHARM REV CODE 250: Performed by: PHYSICIAN ASSISTANT

## 2017-09-14 PROCEDURE — D9220A PRA ANESTHESIA: Mod: CRNA,,, | Performed by: NURSE ANESTHETIST, CERTIFIED REGISTERED

## 2017-09-14 PROCEDURE — 0D9P0ZZ DRAINAGE OF RECTUM, OPEN APPROACH: ICD-10-PCS | Performed by: SURGERY

## 2017-09-14 PROCEDURE — 25000003 PHARM REV CODE 250: Performed by: INTERNAL MEDICINE

## 2017-09-14 PROCEDURE — 25000003 PHARM REV CODE 250: Performed by: NURSE ANESTHETIST, CERTIFIED REGISTERED

## 2017-09-14 PROCEDURE — 71000039 HC RECOVERY, EACH ADD'L HOUR: Performed by: SURGERY

## 2017-09-14 PROCEDURE — 37000009 HC ANESTHESIA EA ADD 15 MINS: Performed by: SURGERY

## 2017-09-14 PROCEDURE — 46040 I&D ISCHIORCT&/PERIRCT ABSC: CPT | Mod: ,,, | Performed by: SURGERY

## 2017-09-14 PROCEDURE — 37000008 HC ANESTHESIA 1ST 15 MINUTES: Performed by: SURGERY

## 2017-09-14 PROCEDURE — 36000705 HC OR TIME LEV I EA ADD 15 MIN: Performed by: SURGERY

## 2017-09-14 RX ORDER — ACETAMINOPHEN 500 MG
500 TABLET ORAL EVERY 6 HOURS PRN
Status: DISCONTINUED | OUTPATIENT
Start: 2017-09-14 | End: 2017-09-17 | Stop reason: HOSPADM

## 2017-09-14 RX ORDER — ASPIRIN 81 MG/1
81 TABLET ORAL DAILY
Status: DISCONTINUED | OUTPATIENT
Start: 2017-09-14 | End: 2017-09-17 | Stop reason: HOSPADM

## 2017-09-14 RX ORDER — POTASSIUM CHLORIDE 20 MEQ/15ML
60 SOLUTION ORAL
Status: DISCONTINUED | OUTPATIENT
Start: 2017-09-14 | End: 2017-09-17 | Stop reason: HOSPADM

## 2017-09-14 RX ORDER — LIDOCAINE HCL/PF 100 MG/5ML
SYRINGE (ML) INTRAVENOUS
Status: DISCONTINUED | OUTPATIENT
Start: 2017-09-14 | End: 2017-09-14

## 2017-09-14 RX ORDER — SODIUM CHLORIDE, SODIUM LACTATE, POTASSIUM CHLORIDE, CALCIUM CHLORIDE 600; 310; 30; 20 MG/100ML; MG/100ML; MG/100ML; MG/100ML
INJECTION, SOLUTION INTRAVENOUS CONTINUOUS PRN
Status: DISCONTINUED | OUTPATIENT
Start: 2017-09-14 | End: 2017-09-14

## 2017-09-14 RX ORDER — FENTANYL CITRATE 50 UG/ML
INJECTION, SOLUTION INTRAMUSCULAR; INTRAVENOUS
Status: DISCONTINUED | OUTPATIENT
Start: 2017-09-14 | End: 2017-09-14

## 2017-09-14 RX ORDER — SODIUM CHLORIDE 9 MG/ML
INJECTION, SOLUTION INTRAVENOUS CONTINUOUS
Status: ACTIVE | OUTPATIENT
Start: 2017-09-14 | End: 2017-09-15

## 2017-09-14 RX ORDER — GLYCOPYRROLATE 0.2 MG/ML
INJECTION INTRAMUSCULAR; INTRAVENOUS
Status: DISCONTINUED | OUTPATIENT
Start: 2017-09-14 | End: 2017-09-14

## 2017-09-14 RX ORDER — ENOXAPARIN SODIUM 100 MG/ML
40 INJECTION SUBCUTANEOUS EVERY 24 HOURS
Status: DISCONTINUED | OUTPATIENT
Start: 2017-09-14 | End: 2017-09-17 | Stop reason: HOSPADM

## 2017-09-14 RX ORDER — INSULIN ASPART 100 [IU]/ML
5 INJECTION, SOLUTION INTRAVENOUS; SUBCUTANEOUS
Status: DISCONTINUED | OUTPATIENT
Start: 2017-09-14 | End: 2017-09-17 | Stop reason: HOSPADM

## 2017-09-14 RX ORDER — HYDRALAZINE HYDROCHLORIDE 20 MG/ML
10 INJECTION INTRAMUSCULAR; INTRAVENOUS ONCE
Status: COMPLETED | OUTPATIENT
Start: 2017-09-14 | End: 2017-09-14

## 2017-09-14 RX ORDER — POTASSIUM CHLORIDE 20 MEQ/15ML
40 SOLUTION ORAL
Status: DISCONTINUED | OUTPATIENT
Start: 2017-09-14 | End: 2017-09-17 | Stop reason: HOSPADM

## 2017-09-14 RX ORDER — AMOXICILLIN 250 MG
1 CAPSULE ORAL 2 TIMES DAILY PRN
Status: DISCONTINUED | OUTPATIENT
Start: 2017-09-14 | End: 2017-09-17 | Stop reason: HOSPADM

## 2017-09-14 RX ORDER — RAMELTEON 8 MG/1
8 TABLET ORAL NIGHTLY PRN
Status: DISCONTINUED | OUTPATIENT
Start: 2017-09-14 | End: 2017-09-17 | Stop reason: HOSPADM

## 2017-09-14 RX ORDER — ONDANSETRON 2 MG/ML
INJECTION INTRAMUSCULAR; INTRAVENOUS
Status: DISCONTINUED | OUTPATIENT
Start: 2017-09-14 | End: 2017-09-14

## 2017-09-14 RX ORDER — MIDAZOLAM HYDROCHLORIDE 1 MG/ML
INJECTION, SOLUTION INTRAMUSCULAR; INTRAVENOUS
Status: DISCONTINUED | OUTPATIENT
Start: 2017-09-14 | End: 2017-09-14

## 2017-09-14 RX ORDER — SODIUM CHLORIDE 0.9 % (FLUSH) 0.9 %
3 SYRINGE (ML) INJECTION
Status: DISCONTINUED | OUTPATIENT
Start: 2017-09-14 | End: 2017-09-17 | Stop reason: HOSPADM

## 2017-09-14 RX ORDER — IBUPROFEN 200 MG
1 TABLET ORAL DAILY
Status: DISCONTINUED | OUTPATIENT
Start: 2017-09-14 | End: 2017-09-14

## 2017-09-14 RX ORDER — METOCLOPRAMIDE HYDROCHLORIDE 5 MG/ML
INJECTION INTRAMUSCULAR; INTRAVENOUS
Status: DISCONTINUED | OUTPATIENT
Start: 2017-09-14 | End: 2017-09-14

## 2017-09-14 RX ORDER — CLONIDINE HYDROCHLORIDE 0.1 MG/1
0.1 TABLET ORAL 3 TIMES DAILY PRN
Status: DISCONTINUED | OUTPATIENT
Start: 2017-09-14 | End: 2017-09-17 | Stop reason: HOSPADM

## 2017-09-14 RX ORDER — INSULIN ASPART 100 [IU]/ML
1-10 INJECTION, SOLUTION INTRAVENOUS; SUBCUTANEOUS
Status: DISCONTINUED | OUTPATIENT
Start: 2017-09-14 | End: 2017-09-17 | Stop reason: HOSPADM

## 2017-09-14 RX ORDER — ONDANSETRON 2 MG/ML
4 INJECTION INTRAMUSCULAR; INTRAVENOUS EVERY 8 HOURS PRN
Status: DISCONTINUED | OUTPATIENT
Start: 2017-09-14 | End: 2017-09-17 | Stop reason: HOSPADM

## 2017-09-14 RX ORDER — HYDROMORPHONE HYDROCHLORIDE 2 MG/ML
0.2 INJECTION, SOLUTION INTRAMUSCULAR; INTRAVENOUS; SUBCUTANEOUS EVERY 5 MIN PRN
Status: DISCONTINUED | OUTPATIENT
Start: 2017-09-14 | End: 2017-09-16

## 2017-09-14 RX ORDER — PROPOFOL 10 MG/ML
VIAL (ML) INTRAVENOUS
Status: DISCONTINUED | OUTPATIENT
Start: 2017-09-14 | End: 2017-09-14

## 2017-09-14 RX ORDER — ONDANSETRON 2 MG/ML
8 INJECTION INTRAMUSCULAR; INTRAVENOUS EVERY 8 HOURS PRN
Status: DISCONTINUED | OUTPATIENT
Start: 2017-09-14 | End: 2017-09-17 | Stop reason: HOSPADM

## 2017-09-14 RX ORDER — IBUPROFEN 200 MG
24 TABLET ORAL
Status: DISCONTINUED | OUTPATIENT
Start: 2017-09-14 | End: 2017-09-17 | Stop reason: HOSPADM

## 2017-09-14 RX ORDER — IBUPROFEN 200 MG
16 TABLET ORAL
Status: DISCONTINUED | OUTPATIENT
Start: 2017-09-14 | End: 2017-09-17 | Stop reason: HOSPADM

## 2017-09-14 RX ORDER — OXYCODONE AND ACETAMINOPHEN 5; 325 MG/1; MG/1
1 TABLET ORAL EVERY 6 HOURS PRN
Status: DISCONTINUED | OUTPATIENT
Start: 2017-09-14 | End: 2017-09-16

## 2017-09-14 RX ADMIN — FENTANYL CITRATE 100 MCG: 50 INJECTION INTRAMUSCULAR; INTRAVENOUS at 09:09

## 2017-09-14 RX ADMIN — ACETAMINOPHEN 500 MG: 500 TABLET ORAL at 03:09

## 2017-09-14 RX ADMIN — LIDOCAINE HYDROCHLORIDE 5 ML: 20 INJECTION, SOLUTION INTRAVENOUS at 09:09

## 2017-09-14 RX ADMIN — ONDANSETRON 4 MG: 2 INJECTION, SOLUTION INTRAMUSCULAR; INTRAVENOUS at 08:09

## 2017-09-14 RX ADMIN — VANCOMYCIN HYDROCHLORIDE 1000 MG: 1 INJECTION, POWDER, LYOPHILIZED, FOR SOLUTION INTRAVENOUS at 08:09

## 2017-09-14 RX ADMIN — PIPERACILLIN SODIUM AND TAZOBACTAM SODIUM 4.5 G: 4; .5 INJECTION, POWDER, LYOPHILIZED, FOR SOLUTION INTRAVENOUS at 09:09

## 2017-09-14 RX ADMIN — INSULIN DETEMIR 15 UNITS: 100 INJECTION, SOLUTION SUBCUTANEOUS at 09:09

## 2017-09-14 RX ADMIN — VANCOMYCIN HYDROCHLORIDE 1000 MG: 1 INJECTION, POWDER, LYOPHILIZED, FOR SOLUTION INTRAVENOUS at 03:09

## 2017-09-14 RX ADMIN — PIPERACILLIN SODIUM AND TAZOBACTAM SODIUM 4.5 G: 4; .5 INJECTION, POWDER, LYOPHILIZED, FOR SOLUTION INTRAVENOUS at 05:09

## 2017-09-14 RX ADMIN — HYDROMORPHONE HYDROCHLORIDE 1 MG: 2 INJECTION, SOLUTION INTRAMUSCULAR; INTRAVENOUS; SUBCUTANEOUS at 12:09

## 2017-09-14 RX ADMIN — HYDRALAZINE HYDROCHLORIDE 10 MG: 20 INJECTION INTRAMUSCULAR; INTRAVENOUS at 10:09

## 2017-09-14 RX ADMIN — SODIUM CHLORIDE: 0.9 INJECTION, SOLUTION INTRAVENOUS at 05:09

## 2017-09-14 RX ADMIN — SODIUM CHLORIDE, SODIUM LACTATE, POTASSIUM CHLORIDE, AND CALCIUM CHLORIDE: .6; .31; .03; .02 INJECTION, SOLUTION INTRAVENOUS at 08:09

## 2017-09-14 RX ADMIN — GLYCOPYRROLATE 0.2 MG: 0.2 INJECTION, SOLUTION INTRAMUSCULAR; INTRAVENOUS at 08:09

## 2017-09-14 RX ADMIN — INSULIN ASPART 5 UNITS: 100 INJECTION, SOLUTION INTRAVENOUS; SUBCUTANEOUS at 03:09

## 2017-09-14 RX ADMIN — HYDROMORPHONE HYDROCHLORIDE 1 MG: 2 INJECTION, SOLUTION INTRAMUSCULAR; INTRAVENOUS; SUBCUTANEOUS at 05:09

## 2017-09-14 RX ADMIN — HYDROMORPHONE HYDROCHLORIDE 0.2 MG: 2 INJECTION, SOLUTION INTRAMUSCULAR; INTRAVENOUS; SUBCUTANEOUS at 11:09

## 2017-09-14 RX ADMIN — HYDROMORPHONE HYDROCHLORIDE 1 MG: 2 INJECTION, SOLUTION INTRAMUSCULAR; INTRAVENOUS; SUBCUTANEOUS at 07:09

## 2017-09-14 RX ADMIN — METOCLOPRAMIDE 10 MG: 5 INJECTION, SOLUTION INTRAMUSCULAR; INTRAVENOUS at 08:09

## 2017-09-14 RX ADMIN — PROPOFOL 200 MG: 10 INJECTION, EMULSION INTRAVENOUS at 09:09

## 2017-09-14 RX ADMIN — HYDROMORPHONE HYDROCHLORIDE 1 MG: 2 INJECTION, SOLUTION INTRAMUSCULAR; INTRAVENOUS; SUBCUTANEOUS at 03:09

## 2017-09-14 RX ADMIN — PIPERACILLIN SODIUM AND TAZOBACTAM SODIUM 4.5 G: 4; .5 INJECTION, POWDER, LYOPHILIZED, FOR SOLUTION INTRAVENOUS at 01:09

## 2017-09-14 RX ADMIN — ENOXAPARIN SODIUM 40 MG: 100 INJECTION SUBCUTANEOUS at 05:09

## 2017-09-14 RX ADMIN — MIDAZOLAM HYDROCHLORIDE 2 MG: 1 INJECTION, SOLUTION INTRAMUSCULAR; INTRAVENOUS at 08:09

## 2017-09-14 NOTE — H&P
[facility] Medicine  History & Physical    Patient Name: Nicholas Marshall  MRN: [id number]  Admission Date: 9/13/2017  Attending Physician: Ariana Montoya MD   Primary Care Provider: Kaye Ortiz MD         Patient information was obtained from patient.     Subjective:     Principal Problem:Rectal abscess    Chief Complaint: Rectal pain for one day.    HPI: Mr. Nicholas Marshall is a 52 y.o. male with essential hypertension, type 2 diabetes mellitus (HbA1c 12.3% Jun 2017), chronic hepatitis C, mild protein malnutrition, and tobacco abuse who presents to [facility] ED with complaints of rectal pain for one day.  He reports that the pain is worse upon standing and walking, but that it doesn't hurt too bad when he's sitting.  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  He denies any fevers, chills, nausea, vomiting, diaphoresis, nor any palpitations.  He has not had any hematochezia or melena, but says that having a bowel movement hurts a lot.  He has never had similar pains in the past.    Chart Review:  Patient has not had any recent hospitalizations within the system.    Outpatient Follow-Up  Date of Visit Physician Service   Jun 2017 BRYAN Charles MD Hepatology    Jun 2017 Kaye Ortiz MD Primary Care   Nov 2015 Ivis Olguin, DIONY Endocrinology    May 2014 Daryl Pool MD Cardioogy      Past Medical History:   Diagnosis Date    Diabetes mellitus     Diabetes mellitus type 2 in nonobese 9/16/2013    Hepatitis C virus infection 9/16/2013    Hypertension     Obesity 9/16/2013    Intentional 75 ib weight loss    Splenomegaly        Past Surgical History:   Procedure Laterality Date    CHOLECYSTECTOMY      2003    COLONOSCOPY N/A 1/25/2016    Procedure: COLONOSCOPY;  Surgeon: Freddy Almendarez MD;  Location: [facility] ([address] [place] [address]);  Service: Endoscopy;  Laterality: N/A;    inguinal hernia  2001    left       Review of patient's allergies indicates:  No  "Known Allergies    No current facility-administered medications on file prior to encounter.      Current Outpatient Prescriptions on File Prior to Encounter   Medication Sig    multivit with min-FA-lycopene (ONE-A-DAY MEN'S) 0.4-600 mg-mcg Tab Take by mouth.    acetone, urine, test (KETONE URINE TEST) Strp 1 strip by Misc.(Non-Drug; Combo Route) route daily as needed (high blood sugar).    aspirin (ECOTRIN) 81 MG EC tablet Take 81 mg by mouth once daily.    blood sugar diagnostic (BLOOD GLUCOSE TEST) Strp Pt test BG 4x/day    blood sugar diagnostic Strp Bg monitoring 4 times a day. Pt uses one touch verio meter.    insulin glargine (LANTUS SOLOSTAR) 100 unit/mL (3 mL) InPn pen Inject 15 Units into the skin once daily.    insulin lispro (HUMALOG KWIKPEN) 100 unit/mL InPn pen Inject 18 units into skin with each meal plus correction scale 180-230 +2, 231-280 +4, 281-330 +6, 331-380 +8, >380 +10 as needed.    insulin needles, disposable, (BD INSULIN PEN NEEDLE UF MINI) 31 x 3/16 " Ndle USE DAILY WITH INSULIN PENS    lancets Misc BG monitoring 4 times a day. Pt uses one touch verio meter.     Family History     Problem Relation (Age of Onset)    Alzheimer's disease Paternal Grandfather    Asthma Daughter, Son, Grandchild    Coronary artery disease Father    Diabetes Sister    Hypertension Father    Ovarian cancer Mother, Sister        Social History Main Topics    Smoking status: Current Every Day Smoker     Types: Vaping with nicotine    Smokeless tobacco: Former User     Types: Snuff      Comment: dips since 16 years - 1 can daily down from 2 cans daily    Alcohol use No      Comment:  last alcohol Easter 2013    Drug use: No    Sexual activity: Yes     Partners: Female     Review of Systems   Constitutional: Negative for activity change, appetite change, chills, diaphoresis, fatigue, fever and unexpected weight change.   HENT: Negative.    Eyes: Negative.    Respiratory: Negative for cough, chest " tightness, shortness of breath and wheezing.    Cardiovascular: Negative for chest pain, palpitations and leg swelling.   Gastrointestinal: Positive for diarrhea. Negative for abdominal distention, abdominal pain, blood in stool, constipation, nausea and vomiting.        Rectal pain   Genitourinary: Negative for dysuria and hematuria.   Musculoskeletal: Negative.    Skin: Negative.    Neurological: Negative for dizziness, seizures, syncope, weakness and light-headedness.   Psychiatric/Behavioral: Negative.      Objective:     Vital Signs (Most Recent):  Temp: 98.2 °F (36.8 °C) (09/13/17 2342)  Pulse: 89 (09/13/17 2342)  Resp: 16 (09/13/17 2342)  BP: 139/89 (09/13/17 2342)  SpO2: 96 % (09/13/17 2342) Vital Signs (24h Range):  Temp:  [98.2 °F (36.8 °C)-99.2 °F (37.3 °C)] 98.2 °F (36.8 °C)  Pulse:  [74-98] 89  Resp:  [16-20] 16  SpO2:  [96 %-98 %] 96 %  BP: (129-173)/(70-94) 139/89     Weight: 87.5 kg (192 lb 14.4 oz)  Body mass index is 26.9 kg/m².    Physical Exam   Constitutional: He is oriented to person, place, and time. He appears well-developed and well-nourished. No distress.   HENT:   Head: Normocephalic and atraumatic.   Right Ear: External ear normal.   Left Ear: External ear normal.   Nose: Nose normal.   Eyes: Right eye exhibits no discharge. Left eye exhibits no discharge.   Cardiovascular: Normal rate, regular rhythm, normal heart sounds and intact distal pulses.  Exam reveals no gallop and no friction rub.    No murmur heard.  Pulmonary/Chest: Effort normal and breath sounds normal. No respiratory distress. He has no wheezes. He has no rales. He exhibits no tenderness.   Abdominal: Soft. Bowel sounds are normal. He exhibits no distension. There is no tenderness. There is no guarding. No hernia.   Genitourinary:   Genitourinary Comments: deferred   Musculoskeletal: Normal range of motion. He exhibits no edema.   Neurological: He is alert and oriented to person, place, and time.   Skin: Skin is warm and  "dry. He is not diaphoretic. No erythema.   Psychiatric: He has a normal mood and affect. His behavior is normal. Judgment and thought content normal.   Nursing note and vitals reviewed.       Significant Labs: All pertinent labs within the past 24 hours have been reviewed.    Significant Imaging: I have reviewed and interpreted all pertinent imaging results/findings within the past 24 hours.    Assessment/Plan:     * Rectal abscess    Patient had rectal pain and upon evaluation via CT-abd/pelvis, he was found with "[l]ow density focus within the lower rectal wall near the anal sphincter measuring 1.8 x 1.3 x 1.9 cm in size. A rectal abscess is suspected."  He does not meet criteria and has been started on empiric antibiotic therapy with vancomycin and piperacillin/tazobactam.  Blood and urine cultures are pending.  Will consult General Surgery for further recommendations.        Essential hypertension    Patient's blood pressure is well-controlled and is no longer on medications; will provide as-needed clonidine.        Type 2 diabetes mellitus, uncontrolled    Poorly-controlled on a home regimen of basal-prandial insulin therapy; will provide basal-prandial insulin along with insulin sliding scale.        Hepatitis C virus infection    Stable; will encourage continued follow-up with his hepatologist.        Mild protein malnutrition    Will provide protein supplementation with Boost Glucose.        Tobacco abuse    Patient was counseled on smoking cessation and he will be provided a nicotine transdermal patch applied while inpatient.  Will provide additional smoking cessation counseling prior to discharge.          VTE Risk Mitigation         Ordered     enoxaparin injection 40 mg  Daily     Route:  Subcutaneous        09/14/17 0452     Medium Risk of VTE  Once      09/14/17 0452             Total time spent on case: 45 minutes.        Yoselin Erwin M.D.  Staff Nocturnist  Department of Hospital Medicine [facility]  Pager: [phone]

## 2017-09-14 NOTE — ASSESSMENT & PLAN NOTE
Poorly-controlled on a home regimen of basal-prandial insulin therapy; will provide basal-prandial insulin along with insulin sliding scale.

## 2017-09-14 NOTE — ANESTHESIA PREPROCEDURE EVALUATION
09/14/2017  Nicholas Marshall is a 52 y.o., male.    Pre-op Assessment    I have reviewed the Patient Summary Reports.      I have reviewed the Medications.     Review of Systems  Anesthesia Hx:  No problems with previous Anesthesia  History of prior surgery of interest to airway management or planning:  Denies Personal Hx of Anesthesia complications.   Cardiovascular:   Hypertension    Hepatic/GI:   Liver Disease, Hepatitis, C    Endocrine:   Diabetes (HbA1c > 11), poorly controlled, type 2        Physical Exam  General:  Well nourished    Airway/Jaw/Neck:  Airway Findings: Mouth Opening: Normal Tongue: Normal  General Airway Assessment: Adult  Mallampati: II  TM Distance: Normal, at least 6 cm       Chest/Lungs:  Chest/Lungs Findings: Clear to auscultation, Normal Respiratory Rate     Heart/Vascular:  Heart Findings: Rate: Normal  Rhythm: Regular Rhythm             Anesthesia Plan  Type of Anesthesia, risks & benefits discussed:  Anesthesia Type:  general  Patient's Preference:   Intra-op Monitoring Plan: standard ASA monitors  Intra-op Monitoring Plan Comments:   Post Op Pain Control Plan: IV/PO Opioids PRN  Post Op Pain Control Plan Comments:   Induction:   IV  Beta Blocker:  Patient is not currently on a Beta-Blocker (No further documentation required).       Informed Consent: Patient understands risks and agrees with Anesthesia plan.  Questions answered. Anesthesia consent signed with patient.  ASA Score: 3     Day of Surgery Review of History & Physical:    H&P update referred to the surgeon.         Ready For Surgery From Anesthesia Perspective.     Patient Active Problem List   Diagnosis    Hepatitis C virus infection    Arrhythmia    Obesity    Essential hypertension    Smokeless tobacco use    Type 2 diabetes mellitus, uncontrolled    Hypogonadism, male    Colon cancer screening     Hepatic cirrhosis    Hyperlipidemia associated with type 2 diabetes mellitus    Rectal abscess    Mild protein malnutrition    Tobacco abuse

## 2017-09-14 NOTE — BRIEF OP NOTE
[facility]  Brief Operative Note    SUMMARY     Surgery Date: 9/14/2017     Surgeon(s) and Role:     * Moses Garza MD - Primary    Pre-op Diagnosis:  Rebekah-rectal abscess [K61.1]    Post-op Diagnosis:  Post-Op Diagnosis Codes:     * Rebekah-rectal abscess [K61.1]    Procedure(s) (LRB):  INCISION AND DRAINAGE (I & D)-PERIRECTAL (N/A)    Anesthesia: General/MAC    Description of Procedure: see dictation    Description of the findings of the procedure: see dictation    Estimated Blood Loss: 40 ml         Specimens:   Wound cultures

## 2017-09-14 NOTE — TRANSFER OF CARE
"Anesthesia Transfer of Care Note    Patient: Nicholas Marshall    Procedure(s) Performed: Procedure(s) (LRB):  INCISION AND DRAINAGE (I & D)-PERIRECTAL (N/A)    Patient location: PACU    Anesthesia Type: general    Transport from OR: Transported from OR on room air with adequate spontaneous ventilation    Post pain: adequate analgesia    Post assessment: no apparent anesthetic complications and tolerated procedure well    Post vital signs: stable    Level of consciousness: awake, alert and oriented    Nausea/Vomiting: no nausea/vomiting    Complications: none    Transfer of care protocol was followed      Last vitals:   Visit Vitals  BP (!) 140/92   Pulse 96   Temp 36.6 °C (97.9 °F) (Oral)   Resp 14   Ht 5' 11" (1.803 m)   Wt 87.5 kg (192 lb 14.4 oz)   SpO2 (!) 94%   BMI 26.90 kg/m²     "

## 2017-09-14 NOTE — SUBJECTIVE & OBJECTIVE
"Past Medical History:   Diagnosis Date    Diabetes mellitus     Diabetes mellitus type 2 in nonobese 9/16/2013    Hepatitis C virus infection 9/16/2013    Hypertension     Obesity 9/16/2013    Intentional 75 ib weight loss    Splenomegaly        Past Surgical History:   Procedure Laterality Date    CHOLECYSTECTOMY      2003    COLONOSCOPY N/A 1/25/2016    Procedure: COLONOSCOPY;  Surgeon: Freddy Almendarez MD;  Location: [address]);  Service: Endoscopy;  Laterality: N/A;    inguinal hernia  2001    left       Review of patient's allergies indicates:  No Known Allergies    No current facility-administered medications on file prior to encounter.      Current Outpatient Prescriptions on File Prior to Encounter   Medication Sig    multivit with min-FA-lycopene (ONE-A-DAY MEN'S) 0.4-600 mg-mcg Tab Take by mouth.    acetone, urine, test (KETONE URINE TEST) Strp 1 strip by Misc.(Non-Drug; Combo Route) route daily as needed (high blood sugar).    aspirin (ECOTRIN) 81 MG EC tablet Take 81 mg by mouth once daily.    blood sugar diagnostic (BLOOD GLUCOSE TEST) Strp Pt test BG 4x/day    blood sugar diagnostic Strp Bg monitoring 4 times a day. Pt uses one touch verio meter.    insulin glargine (LANTUS SOLOSTAR) 100 unit/mL (3 mL) InPn pen Inject 15 Units into the skin once daily.    insulin lispro (HUMALOG KWIKPEN) 100 unit/mL InPn pen Inject 18 units into skin with each meal plus correction scale 180-230 +2, 231-280 +4, 281-330 +6, 331-380 +8, >380 +10 as needed.    insulin needles, disposable, (BD INSULIN PEN NEEDLE UF MINI) 31 x 3/16 " Ndle USE DAILY WITH INSULIN PENS    lancets Misc BG monitoring 4 times a day. Pt uses one touch verio meter.     Family History     Problem Relation (Age of Onset)    Alzheimer's disease Paternal Grandfather    Asthma Daughter, Son, Grandchild    Coronary artery disease Father    Diabetes Sister    Hypertension Father    Ovarian cancer Mother, Sister        Social " History Main Topics    Smoking status: Current Every Day Smoker     Types: Vaping with nicotine    Smokeless tobacco: Former User     Types: Snuff      Comment: dips since 16 years - 1 can daily down from 2 cans daily    Alcohol use No      Comment:  last alcohol Easter 2013    Drug use: No    Sexual activity: Yes     Partners: Female     Review of Systems   Constitutional: Negative for activity change, appetite change, chills, diaphoresis, fatigue, fever and unexpected weight change.   HENT: Negative.    Eyes: Negative.    Respiratory: Negative for cough, chest tightness, shortness of breath and wheezing.    Cardiovascular: Negative for chest pain, palpitations and leg swelling.   Gastrointestinal: Positive for diarrhea. Negative for abdominal distention, abdominal pain, blood in stool, constipation, nausea and vomiting.        Rectal pain   Genitourinary: Negative for dysuria and hematuria.   Musculoskeletal: Negative.    Skin: Negative.    Neurological: Negative for dizziness, seizures, syncope, weakness and light-headedness.   Psychiatric/Behavioral: Negative.      Objective:     Vital Signs (Most Recent):  Temp: 98.2 °F (36.8 °C) (09/13/17 2342)  Pulse: 89 (09/13/17 2342)  Resp: 16 (09/13/17 2342)  BP: 139/89 (09/13/17 2342)  SpO2: 96 % (09/13/17 2342) Vital Signs (24h Range):  Temp:  [98.2 °F (36.8 °C)-99.2 °F (37.3 °C)] 98.2 °F (36.8 °C)  Pulse:  [74-98] 89  Resp:  [16-20] 16  SpO2:  [96 %-98 %] 96 %  BP: (129-173)/(70-94) 139/89     Weight: 87.5 kg (192 lb 14.4 oz)  Body mass index is 26.9 kg/m².    Physical Exam   Constitutional: He is oriented to person, place, and time. He appears well-developed and well-nourished. No distress.   HENT:   Head: Normocephalic and atraumatic.   Right Ear: External ear normal.   Left Ear: External ear normal.   Nose: Nose normal.   Eyes: Right eye exhibits no discharge. Left eye exhibits no discharge.   Cardiovascular: Normal rate, regular rhythm, normal heart sounds and  intact distal pulses.  Exam reveals no gallop and no friction rub.    No murmur heard.  Pulmonary/Chest: Effort normal and breath sounds normal. No respiratory distress. He has no wheezes. He has no rales. He exhibits no tenderness.   Abdominal: Soft. Bowel sounds are normal. He exhibits no distension. There is no tenderness. There is no guarding. No hernia.   Genitourinary:   Genitourinary Comments: deferred   Musculoskeletal: Normal range of motion. He exhibits no edema.   Neurological: He is alert and oriented to person, place, and time.   Skin: Skin is warm and dry. He is not diaphoretic. No erythema.   Psychiatric: He has a normal mood and affect. His behavior is normal. Judgment and thought content normal.   Nursing note and vitals reviewed.       Significant Labs: All pertinent labs within the past 24 hours have been reviewed.    Significant Imaging: I have reviewed and interpreted all pertinent imaging results/findings within the past 24 hours.

## 2017-09-14 NOTE — NURSING
Pt on unit safety in place. Pt in room 411. Pt family at bedside ED contact on board. Care/ tx reviewed, vitals stable. AOx4

## 2017-09-14 NOTE — CONSULTS
[facility]  Surgery Consult    Subjective:     Reason for consultation: perirectal abscess    History of Present Illness:   Patient is a 52 y.o. male presents with rectal pain. Onset of symptoms was gradual starting 4 days ago with gradually worsening course since that time. The pain is located perirectal area without radiation. Patient describes the pain as pressure-like, continuous and rated as severe. Denies fever or chills.  No hx of previous perirectal abscess.  No BRPBR or melena    Patient Active Problem List   Diagnosis    Hepatitis C virus infection    Arrhythmia    Obesity    Essential hypertension    Smokeless tobacco use    Type 2 diabetes mellitus, uncontrolled    Hypogonadism, male    Colon cancer screening    Hepatic cirrhosis    Hyperlipidemia associated with type 2 diabetes mellitus    Rectal abscess    Mild protein malnutrition    Tobacco abuse          No current facility-administered medications on file prior to encounter.      Current Outpatient Prescriptions on File Prior to Encounter   Medication Sig Dispense Refill    multivit with min-FA-lycopene (ONE-A-DAY MEN'S) 0.4-600 mg-mcg Tab Take by mouth.      acetone, urine, test (KETONE URINE TEST) Strp 1 strip by Misc.(Non-Drug; Combo Route) route daily as needed (high blood sugar). 50 strip 5    aspirin (ECOTRIN) 81 MG EC tablet Take 81 mg by mouth once daily.      blood sugar diagnostic (BLOOD GLUCOSE TEST) Strp Pt test BG 4x/day 200 strip 6    blood sugar diagnostic Strp Bg monitoring 4 times a day. Pt uses one touch verio meter. 150 each 4    insulin glargine (LANTUS SOLOSTAR) 100 unit/mL (3 mL) InPn pen Inject 15 Units into the skin once daily. 5 mL 6    insulin lispro (HUMALOG KWIKPEN) 100 unit/mL InPn pen Inject 18 units into skin with each meal plus correction scale 180-230 +2, 231-280 +4, 281-330 +6, 331-380 +8, >380 +10 as needed. 4 Box 3    insulin needles, disposable, (BD INSULIN PEN NEEDLE UF MINI) 31  "x 3/16 " Ndle USE DAILY WITH INSULIN PENS 200 each 11    lancets Misc BG monitoring 4 times a day. Pt uses one touch verio meter. 150 each 4        Review of patient's allergies indicates:  No Known Allergies       Past Medical History:   Diagnosis Date    Diabetes mellitus     Diabetes mellitus type 2 in nonobese 9/16/2013    Hepatitis C virus infection 9/16/2013    Hypertension     Obesity 9/16/2013    Intentional 75 ib weight loss    Splenomegaly           Past Surgical History:   Procedure Laterality Date    CHOLECYSTECTOMY      2003    COLONOSCOPY N/A 1/25/2016    Procedure: COLONOSCOPY;  Surgeon: Freddy Almendarez MD;  Location: [facility] ([address] [place] [address]);  Service: Endoscopy;  Laterality: N/A;    inguinal hernia  2001    left          Family History   Problem Relation Age of Onset    Ovarian cancer Mother     Coronary artery disease Father      heart transplant    Hypertension Father     Asthma Daughter     Asthma Son     Asthma Grandchild     Diabetes Sister     Ovarian cancer Sister     Alzheimer's disease Paternal Grandfather     Amblyopia Neg Hx     Blindness Neg Hx     Cataracts Neg Hx     Glaucoma Neg Hx     Macular degeneration Neg Hx     Retinal detachment Neg Hx     Strabismus Neg Hx     Prostate cancer Neg Hx     Colon cancer Neg Hx           Social History     Social History    Marital status:      Spouse name: N/A    Number of children: N/A    Years of education: N/A     Occupational History     [employer]     Social History Main Topics    Smoking status: Current Every Day Smoker     Types: Vaping with nicotine    Smokeless tobacco: Former User     Types: Snuff      Comment: dips since 16 years - 1 can daily down from 2 cans daily    Alcohol use No      Comment:  last alcohol Easter 2013    Drug use: No    Sexual activity: Yes     Partners: Female     Other Topics Concern    Not on file     Social History Narrative    No narrative on file         Review " of Systems:  Constitutional: negative for anorexia, chills and fevers  Gastrointestinal: negative for abdominal pain, constipation, diarrhea, nausea and vomiting  Hematologic/lymphatic: negative for bleeding and easy bruising     Physical Exam:    Vitals:    09/14/17 0507   BP: (!) 151/98   Pulse: 85   Resp: 17   Temp: 98.3 °F (36.8 °C)       General: Alert and oriented, No acute distress.   HENT: Normocephalic   Respiratory: Respirations are non-labored, Symmetrical chest wall expansion, No chest wall tenderness.   Cardiovascular: Normal rate, Regular rhythm, Extremities warm, No edema.   Gastrointestinal: Soft, Non-tender, Non-distended, No organomegaly.   Rectal: severe TTP, no external signs of infection.  Unable to complete perform rectal 2/2 to pain.   Neurologic: Alert, Oriented, Normal motor function, No focal defects.   Psychiatric: Cooperative, Appropriate mood & affect, Normal judgment.    Data Review:  CBC:   Recent Labs  Lab 09/13/17  1833   WBC 10.78   RBC 4.73   HGB 14.5   HCT 41.2      MCV 87   MCH 30.7   MCHC 35.2     CMP:   Recent Labs  Lab 09/13/17  1833   *   CALCIUM 9.3   ALBUMIN 3.4*   PROT 7.1      K 3.6   CO2 26      BUN 7   CREATININE 0.9   ALKPHOS 97   *   AST 92*   BILITOT 1.0     CT reviewed.  +perirectal abscess present    ASSESSMENT/PLAN:     Perirectal abscess  - no external evidence but confirmed on CT  - needs EUA and I&D  - R/B/A for I&D discussed at length with the patient  - All questions have been answered  - Patient has verbalized his understanding and wishes to proceed  - OR this AM

## 2017-09-14 NOTE — PLAN OF CARE
Problem: Patient Care Overview  Goal: Plan of Care Review  Pt understand and accepts care. Comfort met via PRN iv pain meds.

## 2017-09-14 NOTE — ASSESSMENT & PLAN NOTE
"Patient had rectal pain and upon evaluation via CT-abd/pelvis, he was found with "[l]ow density focus within the lower rectal wall near the anal sphincter measuring 1.8 x 1.3 x 1.9 cm in size. A rectal abscess is suspected."  He does not meet criteria and has been started on empiric antibiotic therapy with vancomycin and piperacillin/tazobactam.  Blood and urine cultures are pending.  Will consult General Surgery for further recommendations.  "

## 2017-09-14 NOTE — OP NOTE
DATE OF PROCEDURE:  09/14/2017.    PREOPERATIVE DIAGNOSIS:  Perirectal abscess.    POSTOPERATIVE DIAGNOSIS:  Perirectal abscess.    PROCEDURE PERFORMED:  Incision and drainage of perirectal abscess.    SURGEON:  Moses Garza M.D.    ANESTHESIA:  General.    COMPLICATIONS:  None.    ESTIMATED BLOOD LOSS:  40 mL    SPECIMENS:  Wound cultures.    INDICATION FOR PROCEDURE:  The patient is a pleasant 52-year-old male who was   admitted to the Emergency Room with clinical and radiographic findings   consistent with perirectal abscess.  Definitive surgical management was deemed   appropriate.  Informed consent was obtained.    DESCRIPTION OF PROCEDURE:  The patient was taken to the Operating Room, placed   in supine position on the operative table.  Level of monitored anesthesia was   obtained.  The patient was then placed in lithotomy position with the candy cane   stirrups.  The perianal region was then prepped and draped in the usual sterile   manner.  Digital rectal exam was performed, which showed some induration in the   posterior and left posterior regions.  There is also some induration in the   buttock approximately 4 cm from the anal verge.  This area was first opened with   a stab incision and draining a small amount of purulent fluid.  Digital   exploration of this area did not appear to communicate with the abscess that was   present radiographically and directly adjacent into the rectum.  Therefore, an   18-gauge needle was obtained and used this to localize the abscess given that   there was lack of any external evidence or induration or fluctuance in this   area.  Once this was localized, this area was also opened with a #15 blade and   finger used to insert and completely unroof the underlying abscess cavity.    Approximately 20 mL of foul smelling purulent fluid was drained.  Abscess cavity   was digitally explored and confirmed to be completely drained.  Wound was then   copiously irrigated and  subsequently packed with iodoform packing.  Dressings   were then applied.  The patient was then awakened and transferred to Recovery,   having tolerated the procedure without any apparent complications.  All counts   reported to be correct prior to cessation of the case.      ZIGGY/ALIA  dd: 09/14/2017 09:54:17 (CDT)  td: 09/14/2017 10:57:51 (CDT)  Doc ID   #[id number]  Job ID #[id number]    CC:

## 2017-09-14 NOTE — ED PROVIDER NOTES
"Encounter Date: 9/13/2017    SCRIBE #1 NOTE: I, Alec Rosales, am scribing for, and in the presence of,  Mario Sydni. I have scribed the following portions of the note - Other sections scribed: HPI, ROS.       History     Chief Complaint   Patient presents with    Groin Pain     left side scrotal pain to rectum, I think I got a hernia, I was lifting some heavy stuff the other day - Tuesday     CC: Groin Pain    HPI: This 52 y.o. Male with PMHx DM (type II), HTN, splenomegaly, hepatitis C, obesity, and PSHx cholecystectomy, inguinal hernia, and colonoscopy presents to the ED c/o rectal pain which began yesterday morning. Pt reports moving groceries x2 days ago. Pt reports exacerbation when walking which "feels like pressure" and "like a hemorrhoid". Pt denies having similar sx's in the past. Pt reports associated nausea. Exacerbated by walking & sitting up. Pt reports ability to have BM's and pass gas. Pt denies testicular swelling, abd pain, bleeding, and hematuria. Pt says "I think I have a hernia" and reports having L inguinal hernia in the past.       The history is provided by the patient. No  was used.     Review of patient's allergies indicates:  No Known Allergies  Past Medical History:   Diagnosis Date    Diabetes mellitus     Diabetes mellitus type 2 in nonobese 9/16/2013    Hepatitis C virus infection 9/16/2013    Hypertension     Obesity 9/16/2013    Intentional 75 ib weight loss    Splenomegaly      Past Surgical History:   Procedure Laterality Date    CHOLECYSTECTOMY      2003    COLONOSCOPY N/A 1/25/2016    Procedure: COLONOSCOPY;  Surgeon: Freddy Almendarez MD;  Location: [facility] ([address] [place] [address];  Service: Endoscopy;  Laterality: N/A;    inguinal hernia  2001    left     Family History   Problem Relation Age of Onset    Ovarian cancer Mother     Coronary artery disease Father      heart transplant    Hypertension Father     Asthma Daughter     Asthma Son     Asthma " Grandchild     Diabetes Sister     Ovarian cancer Sister     Alzheimer's disease Paternal Grandfather     Amblyopia Neg Hx     Blindness Neg Hx     Cataracts Neg Hx     Glaucoma Neg Hx     Macular degeneration Neg Hx     Retinal detachment Neg Hx     Strabismus Neg Hx     Prostate cancer Neg Hx     Colon cancer Neg Hx      Social History   Substance Use Topics    Smoking status: Current Every Day Smoker     Types: Vaping with nicotine    Smokeless tobacco: Former User     Types: Snuff      Comment: dips since 16 years - 1 can daily down from 2 cans daily    Alcohol use No      Comment:  last alcohol Easter 2013     Review of Systems   Constitutional: Negative for fever.   HENT: Negative for sore throat.    Respiratory: Negative for shortness of breath.    Cardiovascular: Negative for chest pain.   Gastrointestinal: Positive for nausea. Negative for abdominal pain.   Genitourinary: Negative for dysuria, hematuria and scrotal swelling.        (+) rectal pain   Musculoskeletal: Negative for back pain.   Skin: Negative for rash.        (-) bleeding   Neurological: Negative for weakness.   Hematological: Does not bruise/bleed easily.       Physical Exam     Initial Vitals [09/13/17 1641]   BP Pulse Resp Temp SpO2   129/88 98 20 99.2 °F (37.3 °C) 98 %      MAP       101.67         Physical Exam    Nursing note and vitals reviewed.  Constitutional: He is not diaphoretic.   Chronically ill appearing. Appears in pain.    HENT:   Head: Normocephalic and atraumatic.   Nose: Nose normal.   Eyes: Conjunctivae and EOM are normal. Right eye exhibits no discharge. Left eye exhibits no discharge.   Neck: Normal range of motion. No tracheal deviation present. No JVD present.   Cardiovascular: Normal rate, regular rhythm and normal heart sounds. Exam reveals no friction rub.    No murmur heard.  Pulmonary/Chest: Breath sounds normal. No stridor. No respiratory distress. He has no wheezes. He has no rhonchi. He has no  rales. He exhibits no tenderness.   Abdominal: Soft. He exhibits no distension. There is no tenderness. There is no rigidity, no rebound, no guarding, no CVA tenderness, no tenderness at McBurney's point and negative Sierra's sign. No hernia.   Genitourinary: Rectal exam shows no external hemorrhoid, no fissure, no mass and guaiac negative stool. Guaiac negative stool. : Acceptable.  Genitourinary Comments: Severe pain with MYLA. No juan blood on exam or palpable mass. MYLA is limited however, secondary to severe pain on exam.    Musculoskeletal: Normal range of motion.   Neurological: He is alert and oriented to person, place, and time.   Skin: Skin is warm and dry. No rash and no abscess noted. No erythema. No pallor.         ED Course   Procedures  Labs Reviewed   URINALYSIS - Abnormal; Notable for the following:        Result Value    Specific Gravity, UA >1.030 (*)     Glucose, UA 3+ (*)     All other components within normal limits   CBC W/ AUTO DIFFERENTIAL - Abnormal; Notable for the following:     Lymph% 17.8 (*)     All other components within normal limits   COMPREHENSIVE METABOLIC PANEL - Abnormal; Notable for the following:     Glucose 328 (*)     Albumin 3.4 (*)     AST 92 (*)      (*)     All other components within normal limits   POCT GLUCOSE - Abnormal; Notable for the following:     POCT Glucose 304 (*)     All other components within normal limits   CULTURE, BLOOD   CULTURE, BLOOD   URINALYSIS MICROSCOPIC   LACTIC ACID, PLASMA   POCT GLUCOSE MONITORING CONTINUOUS          X-Rays:   Independently Interpreted Readings:   Abdomen:   Abdomen and Pelvis CT with Contrast - Rectal abscess     Medical Decision Making:   History:   Old Medical Records: I decided to obtain old medical records.    This is an emergent evaluation of a 52 y.o. male with a PMHx of uncontrolled DM and chronic hepatitis C presenting to the ED for rectal pain. Denies fever, emesis, and abdominal pain. Vitals  WNL, afebrile. Patient is non-toxic appearing and in no acute distress. Abdomen nontender. Severe rectal tenderness. CT concerning for rectal abscess. Not septic. Hyperglycemic without DKA. LFTS elevated, but elevated in this patient with chronic hepatitis C. No fissure or thrombosed external hemorrhoid.     Case discussed with Dr. Erwin. Consult to general surgery added. Blood cultures added and started IV antibiotics. Patient admitted to Dr. Montoya.    I discussed this patient with Dr. Li who is in agreement with my assessment and plan.           Scribe Attestation:   Scribe #1: I performed the above scribed service and the documentation accurately describes the services I performed. I attest to the accuracy of the note.    Attending Attestation:           Physician Attestation for Scribe:  Physician Attestation Statement for Scribe #1: I, Mario Troy PA-C, reviewed documentation, as scribed by in my presence, and it is both accurate and complete.                 ED Course      Clinical Impression:   The primary encounter diagnosis was Rectal abscess. Diagnoses of Uncontrolled other specified diabetes mellitus with complication and Chronic hepatitis C without hepatic coma were also pertinent to this visit.    Disposition:   Disposition: Admitted  Condition: Fair                        Mario Troy PA-C  09/13/17 1417

## 2017-09-14 NOTE — ASSESSMENT & PLAN NOTE
Patient's blood pressure is well-controlled and is no longer on medications; will provide as-needed clonidine.

## 2017-09-14 NOTE — ED NOTES
Asked Dr. Franklin of pt. Can have something to eat and she replied yes- pt. Was given a sandwich tray and juice.

## 2017-09-14 NOTE — ANESTHESIA POSTPROCEDURE EVALUATION
"Anesthesia Post Evaluation    Patient: Nicholas Marshall    Procedure(s) Performed: Procedure(s) (LRB):  INCISION AND DRAINAGE (I & D)-PERIRECTAL (N/A)    Final Anesthesia Type: general  Patient location during evaluation: PACU  Patient participation: Yes- Able to Participate  Level of consciousness: awake and alert and oriented  Post-procedure vital signs: reviewed and stable  Pain management: adequate  Airway patency: patent  PONV status at discharge: No PONV  Anesthetic complications: no      Cardiovascular status: blood pressure returned to baseline and hemodynamically stable  Respiratory status: unassisted, spontaneous ventilation and room air  Hydration status: euvolemic  Follow-up not needed.        Visit Vitals  /75   Pulse 94   Temp 36.6 °C (97.9 °F) (Oral)   Resp (!) 22   Ht 5' 11" (1.803 m)   Wt 87.5 kg (192 lb 14.4 oz)   SpO2 95%   BMI 26.90 kg/m²       Pain/Arnold Score: Pain Assessment Performed: Yes (9/14/2017 11:06 AM)  Presence of Pain: denies (9/14/2017 11:06 AM)  Pain Rating Prior to Med Admin: 5 (9/14/2017 11:22 AM)  Pain Rating Post Med Admin: 5 (9/14/2017 12:40 AM)  Arnold Score: 10 (9/14/2017 11:06 AM)      "

## 2017-09-14 NOTE — HPI
Mr. Nicholas Marshall is a 52 y.o. male with essential hypertension, type 2 diabetes mellitus (HbA1c 12.3% Jun 2017), chronic hepatitis C, mild protein malnutrition, and tobacco abuse who presents to [facility] ED with complaints of rectal pain for one day.  He reports that the pain is worse upon standing and walking, but that it doesn't hurt too bad when he's sitting.  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  He denies any fevers, chills, nausea, vomiting, diaphoresis, nor any palpitations.  He has not had any hematochezia or melena, but says that having a bowel movement hurts a lot.  He has never had similar pains in the past.

## 2017-09-15 LAB
ALBUMIN SERPL BCP-MCNC: 2.7 G/DL
ALP SERPL-CCNC: 76 U/L
ALT SERPL W/O P-5'-P-CCNC: 104 U/L
ANION GAP SERPL CALC-SCNC: 8 MMOL/L
AST SERPL-CCNC: 71 U/L
BASOPHILS # BLD AUTO: 0.01 K/UL
BASOPHILS NFR BLD: 0.1 %
BILIRUB SERPL-MCNC: 1.1 MG/DL
BUN SERPL-MCNC: 9 MG/DL
CALCIUM SERPL-MCNC: 8.8 MG/DL
CHLORIDE SERPL-SCNC: 99 MMOL/L
CO2 SERPL-SCNC: 27 MMOL/L
CREAT SERPL-MCNC: 0.8 MG/DL
DIFFERENTIAL METHOD: ABNORMAL
EOSINOPHIL # BLD AUTO: 0.1 K/UL
EOSINOPHIL NFR BLD: 0.5 %
ERYTHROCYTE [DISTWIDTH] IN BLOOD BY AUTOMATED COUNT: 12.2 %
EST. GFR  (AFRICAN AMERICAN): >60 ML/MIN/1.73 M^2
EST. GFR  (NON AFRICAN AMERICAN): >60 ML/MIN/1.73 M^2
GLUCOSE SERPL-MCNC: 224 MG/DL
HCT VFR BLD AUTO: 37 %
HGB BLD-MCNC: 12.9 G/DL
LYMPHOCYTES # BLD AUTO: 2.3 K/UL
LYMPHOCYTES NFR BLD: 17.4 %
MAGNESIUM SERPL-MCNC: 1.6 MG/DL
MCH RBC QN AUTO: 30.9 PG
MCHC RBC AUTO-ENTMCNC: 34.9 G/DL
MCV RBC AUTO: 89 FL
MONOCYTES # BLD AUTO: 1.4 K/UL
MONOCYTES NFR BLD: 10.3 %
NEUTROPHILS # BLD AUTO: 9.4 K/UL
NEUTROPHILS NFR BLD: 71.7 %
PHOSPHATE SERPL-MCNC: 2.7 MG/DL
PLATELET # BLD AUTO: 161 K/UL
PMV BLD AUTO: 11.1 FL
POCT GLUCOSE: 221 MG/DL (ref 70–110)
POCT GLUCOSE: 240 MG/DL (ref 70–110)
POCT GLUCOSE: 247 MG/DL (ref 70–110)
POCT GLUCOSE: 250 MG/DL (ref 70–110)
POCT GLUCOSE: 262 MG/DL (ref 70–110)
POTASSIUM SERPL-SCNC: 3.5 MMOL/L
PROT SERPL-MCNC: 5.9 G/DL
RBC # BLD AUTO: 4.17 M/UL
SODIUM SERPL-SCNC: 134 MMOL/L
VANCOMYCIN TROUGH SERPL-MCNC: 4.2 UG/ML
WBC # BLD AUTO: 13.11 K/UL

## 2017-09-15 PROCEDURE — 63600175 PHARM REV CODE 636 W HCPCS: Performed by: INTERNAL MEDICINE

## 2017-09-15 PROCEDURE — 63600175 PHARM REV CODE 636 W HCPCS: Performed by: HOSPITALIST

## 2017-09-15 PROCEDURE — 63600175 PHARM REV CODE 636 W HCPCS: Performed by: PHYSICIAN ASSISTANT

## 2017-09-15 PROCEDURE — 36415 COLL VENOUS BLD VENIPUNCTURE: CPT

## 2017-09-15 PROCEDURE — 80202 ASSAY OF VANCOMYCIN: CPT

## 2017-09-15 PROCEDURE — 83735 ASSAY OF MAGNESIUM: CPT

## 2017-09-15 PROCEDURE — 11000001 HC ACUTE MED/SURG PRIVATE ROOM

## 2017-09-15 PROCEDURE — 25000003 PHARM REV CODE 250: Performed by: INTERNAL MEDICINE

## 2017-09-15 PROCEDURE — 80053 COMPREHEN METABOLIC PANEL: CPT

## 2017-09-15 PROCEDURE — 94761 N-INVAS EAR/PLS OXIMETRY MLT: CPT

## 2017-09-15 PROCEDURE — 84100 ASSAY OF PHOSPHORUS: CPT

## 2017-09-15 PROCEDURE — 25000003 PHARM REV CODE 250: Performed by: PHYSICIAN ASSISTANT

## 2017-09-15 PROCEDURE — 25000003 PHARM REV CODE 250: Performed by: HOSPITALIST

## 2017-09-15 PROCEDURE — 85025 COMPLETE CBC W/AUTO DIFF WBC: CPT

## 2017-09-15 RX ADMIN — OXYCODONE AND ACETAMINOPHEN 1 TABLET: 5; 325 TABLET ORAL at 07:09

## 2017-09-15 RX ADMIN — HYDROMORPHONE HYDROCHLORIDE 1 MG: 2 INJECTION, SOLUTION INTRAMUSCULAR; INTRAVENOUS; SUBCUTANEOUS at 12:09

## 2017-09-15 RX ADMIN — VANCOMYCIN HYDROCHLORIDE 1250 MG: 1 INJECTION, POWDER, LYOPHILIZED, FOR SOLUTION INTRAVENOUS at 11:09

## 2017-09-15 RX ADMIN — SODIUM CHLORIDE: 0.9 INJECTION, SOLUTION INTRAVENOUS at 01:09

## 2017-09-15 RX ADMIN — INSULIN ASPART 5 UNITS: 100 INJECTION, SOLUTION INTRAVENOUS; SUBCUTANEOUS at 05:09

## 2017-09-15 RX ADMIN — HYDROMORPHONE HYDROCHLORIDE 1 MG: 2 INJECTION, SOLUTION INTRAMUSCULAR; INTRAVENOUS; SUBCUTANEOUS at 09:09

## 2017-09-15 RX ADMIN — INSULIN ASPART 5 UNITS: 100 INJECTION, SOLUTION INTRAVENOUS; SUBCUTANEOUS at 12:09

## 2017-09-15 RX ADMIN — INSULIN ASPART 4 UNITS: 100 INJECTION, SOLUTION INTRAVENOUS; SUBCUTANEOUS at 12:09

## 2017-09-15 RX ADMIN — VANCOMYCIN HYDROCHLORIDE 1250 MG: 1 INJECTION, POWDER, LYOPHILIZED, FOR SOLUTION INTRAVENOUS at 05:09

## 2017-09-15 RX ADMIN — ASPIRIN 81 MG: 81 TABLET, COATED ORAL at 09:09

## 2017-09-15 RX ADMIN — ENOXAPARIN SODIUM 40 MG: 100 INJECTION SUBCUTANEOUS at 05:09

## 2017-09-15 RX ADMIN — INSULIN DETEMIR 25 UNITS: 100 INJECTION, SOLUTION SUBCUTANEOUS at 09:09

## 2017-09-15 RX ADMIN — INSULIN ASPART 3 UNITS: 100 INJECTION, SOLUTION INTRAVENOUS; SUBCUTANEOUS at 09:09

## 2017-09-15 RX ADMIN — VANCOMYCIN HYDROCHLORIDE 1250 MG: 1 INJECTION, POWDER, LYOPHILIZED, FOR SOLUTION INTRAVENOUS at 09:09

## 2017-09-15 RX ADMIN — ACETAMINOPHEN 500 MG: 500 TABLET ORAL at 05:09

## 2017-09-15 RX ADMIN — PIPERACILLIN SODIUM AND TAZOBACTAM SODIUM 4.5 G: 4; .5 INJECTION, POWDER, LYOPHILIZED, FOR SOLUTION INTRAVENOUS at 09:09

## 2017-09-15 RX ADMIN — INSULIN ASPART 5 UNITS: 100 INJECTION, SOLUTION INTRAVENOUS; SUBCUTANEOUS at 08:09

## 2017-09-15 RX ADMIN — PIPERACILLIN SODIUM AND TAZOBACTAM SODIUM 4.5 G: 4; .5 INJECTION, POWDER, LYOPHILIZED, FOR SOLUTION INTRAVENOUS at 05:09

## 2017-09-15 RX ADMIN — PIPERACILLIN SODIUM AND TAZOBACTAM SODIUM 4.5 G: 4; .5 INJECTION, POWDER, LYOPHILIZED, FOR SOLUTION INTRAVENOUS at 04:09

## 2017-09-15 RX ADMIN — HYDROMORPHONE HYDROCHLORIDE 1 MG: 2 INJECTION, SOLUTION INTRAMUSCULAR; INTRAVENOUS; SUBCUTANEOUS at 01:09

## 2017-09-15 RX ADMIN — HYDROMORPHONE HYDROCHLORIDE 1 MG: 2 INJECTION, SOLUTION INTRAMUSCULAR; INTRAVENOUS; SUBCUTANEOUS at 04:09

## 2017-09-15 NOTE — PROGRESS NOTES
Follow-up Information     Moses Garza MD. Go on 9/26/2017.    Specialty:  General Surgery  Why:  Outpatient Services, Surgery Follow-up Appointment, Please arrive to clinic for 2:00PM  Contact information:  [address] [place] [address] [place] [address]  [phone]             Kaye Ortiz MD. Go on 9/19/2017.    Specialty:  Internal Medicine  Why:  Outpatient Services, PCP Follow-up Appointment, Please arrive to clinic for 10:00AM  Contact information:  [address]  [facility] [address]  [phone]                   [facility]    WRITTEN HEALTHCARE AND DISCHARGE INFORMATION                            Help at Home           [phone]  After discharge for assistance [facility] On Call Nurse Care Line 24/7  Assistance    Things You are responsible For To Manage Your Care At Home:  1.    Getting your prescriptions filled   2.    Taking your medications as directed, DO NOT MISS ANY DOSES!  3.    Going to your follow-up doctor appointment. This is important because it  allow the doctor to monitor your progress and determine if  any changes need to made to your treatment plan.     Thank you for choosing [facility] for your care.  Please answer any calls you may receive from [facility] we want to continue to support you as you manage your healthcare needs. [facility] is happy to have the opportunity to serve you.     Sincerely,  Your [facility] Team,  Nohemi Beckwith LMSW   II  [phone]

## 2017-09-15 NOTE — SUBJECTIVE & OBJECTIVE
Interval History: pt reports non compliant with diabetic diet at home and here in hospital and explained we are unnecessarily chasing blood sugars with higher doses of insulin and tried to educate on importance of his compliancy     Review of Systems   Constitutional: Negative for activity change, appetite change, chills, diaphoresis, fatigue, fever and unexpected weight change.   HENT: Negative.    Eyes: Negative.    Respiratory: Negative for cough, chest tightness, shortness of breath and wheezing.    Cardiovascular: Negative for chest pain, palpitations and leg swelling.   Gastrointestinal: Positive for diarrhea. Negative for abdominal distention, abdominal pain, blood in stool, constipation, nausea and vomiting.        Rectal pain   Genitourinary: Negative for dysuria and hematuria.   Musculoskeletal: Negative.    Skin: Negative.    Neurological: Negative for dizziness, seizures, syncope, weakness and light-headedness.   Psychiatric/Behavioral: Negative.      Objective:     Vital Signs (Most Recent):  Temp: 98.3 °F (36.8 °C) (09/15/17 0802)  Pulse: 82 (09/15/17 0802)  Resp: 18 (09/15/17 0802)  BP: 123/72 (09/15/17 0802)  SpO2: 95 % (09/15/17 0809) Vital Signs (24h Range):  Temp:  [98.3 °F (36.8 °C)-102.1 °F (38.9 °C)] 98.3 °F (36.8 °C)  Pulse:  [] 82  Resp:  [18] 18  SpO2:  [93 %-95 %] 95 %  BP: (119-125)/(64-72) 123/72     Weight: 87.5 kg (192 lb 14.4 oz)  Body mass index is 26.9 kg/m².    Intake/Output Summary (Last 24 hours) at 09/15/17 1130  Last data filed at 09/15/17 0623   Gross per 24 hour   Intake          1476.66 ml   Output                3 ml   Net          1473.66 ml      Physical Exam   Constitutional: He is oriented to person, place, and time. He appears well-developed and well-nourished. No distress.   HENT:   Head: Normocephalic and atraumatic.   Right Ear: External ear normal.   Left Ear: External ear normal.   Nose: Nose normal.   Eyes: Right eye exhibits no discharge. Left eye exhibits  no discharge.   Cardiovascular: Normal rate, regular rhythm, normal heart sounds and intact distal pulses.  Exam reveals no gallop and no friction rub.    No murmur heard.  Pulmonary/Chest: Effort normal and breath sounds normal. No respiratory distress. He has no wheezes. He has no rales. He exhibits no tenderness.   Abdominal: Soft. Bowel sounds are normal. He exhibits no distension. There is no tenderness. There is no guarding. No hernia.   Genitourinary:   Genitourinary Comments: deferred   Musculoskeletal: Normal range of motion. He exhibits no edema.   Neurological: He is alert and oriented to person, place, and time.   Skin: Skin is warm and dry. He is not diaphoretic. No erythema.   Psychiatric: He has a normal mood and affect. His behavior is normal. Judgment and thought content normal.   Nursing note and vitals reviewed.      Significant Labs:   A1C:   Recent Labs  Lab 06/14/17  0710   HGBA1C 12.3*     Blood Culture:   Recent Labs  Lab 09/13/17 2121 09/13/17 2125   LABBLOO No Growth to date  No Growth to date No Growth to date  No Growth to date     BMP:   Recent Labs  Lab 09/15/17  0418   *   *   K 3.5   CL 99   CO2 27   BUN 9   CREATININE 0.8   CALCIUM 8.8   MG 1.6     CBC:   Recent Labs  Lab 09/13/17  1833 09/15/17  0418   WBC 10.78 13.11*   HGB 14.5 12.9*   HCT 41.2 37.0*    161     POCT Glucose:   Recent Labs  Lab 09/14/17  0854 09/14/17  2309 09/15/17  0537   POCTGLUCOSE 259* 240* 250*       Significant Imaging: I have reviewed all pertinent imaging results/findings within the past 24 hours.

## 2017-09-15 NOTE — PROGRESS NOTES
EVONNE contacted Dr. Garza office @ [phone] to schedule surgery follow-up, EVONNE spoke to Jamila, appointment scheduled for 8/26/17 @ 2:00pm. EVONNE contacted [facility] @ [phone] to schedule PCP follow-up, EVONNE spoke to Nelson, patient will see Dr. Ortiz on 8/19/17 @ 10:00am.

## 2017-09-15 NOTE — PROGRESS NOTES
[facility] Hospital Medicine  Progress Note    Patient Name: Nicholas Marshall  MRN: [id number]  Patient Class: IP- Inpatient   Admission Date: 9/13/2017  Length of Stay: 2 days  Attending Physician: Ariana Montoya MD  Primary Care Provider: Kaye Ortiz MD        Subjective:     Principal Problem:Rectal abscess    HPI:  Mr. Nicholas Marshall is a 52 y.o. male with essential hypertension, type 2 diabetes mellitus (HbA1c 12.3% Jun 2017), chronic hepatitis C, mild protein malnutrition, and tobacco abuse who presents to [facility] ED with complaints of rectal pain for one day.  He reports that the pain is worse upon standing and walking, but that it doesn't hurt too bad when he's sitting.  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  He denies any fevers, chills, nausea, vomiting, diaphoresis, nor any palpitations.  He has not had any hematochezia or melena, but says that having a bowel movement hurts a lot.  He has never had similar pains in the past.    Hospital Course:  Pt admitted with perirectal abscess. I&D 9/14/2017. Cultures show no growth to date. Febrile on Vanc and zosyn. Vanc dose increased. If afebrile x 24 hours will dc home.     Interval History: pt reports non compliant with diabetic diet at home and here in hospital and explained we are unnecessarily chasing blood sugars with higher doses of insulin and tried to educate on importance of his compliancy     Review of Systems   Constitutional: Negative for activity change, appetite change, chills, diaphoresis, fatigue, fever and unexpected weight change.   HENT: Negative.    Eyes: Negative.    Respiratory: Negative for cough, chest tightness, shortness of breath and wheezing.    Cardiovascular: Negative for chest pain, palpitations and leg swelling.   Gastrointestinal: Positive for diarrhea. Negative for abdominal distention, abdominal pain, blood in stool, constipation, nausea and vomiting.        Rectal pain    Genitourinary: Negative for dysuria and hematuria.   Musculoskeletal: Negative.    Skin: Negative.    Neurological: Negative for dizziness, seizures, syncope, weakness and light-headedness.   Psychiatric/Behavioral: Negative.      Objective:     Vital Signs (Most Recent):  Temp: 98.3 °F (36.8 °C) (09/15/17 0802)  Pulse: 82 (09/15/17 0802)  Resp: 18 (09/15/17 0802)  BP: 123/72 (09/15/17 0802)  SpO2: 95 % (09/15/17 0809) Vital Signs (24h Range):  Temp:  [98.3 °F (36.8 °C)-102.1 °F (38.9 °C)] 98.3 °F (36.8 °C)  Pulse:  [] 82  Resp:  [18] 18  SpO2:  [93 %-95 %] 95 %  BP: (119-125)/(64-72) 123/72     Weight: 87.5 kg (192 lb 14.4 oz)  Body mass index is 26.9 kg/m².    Intake/Output Summary (Last 24 hours) at 09/15/17 1130  Last data filed at 09/15/17 0623   Gross per 24 hour   Intake          1476.66 ml   Output                3 ml   Net          1473.66 ml      Physical Exam   Constitutional: He is oriented to person, place, and time. He appears well-developed and well-nourished. No distress.   HENT:   Head: Normocephalic and atraumatic.   Right Ear: External ear normal.   Left Ear: External ear normal.   Nose: Nose normal.   Eyes: Right eye exhibits no discharge. Left eye exhibits no discharge.   Cardiovascular: Normal rate, regular rhythm, normal heart sounds and intact distal pulses.  Exam reveals no gallop and no friction rub.    No murmur heard.  Pulmonary/Chest: Effort normal and breath sounds normal. No respiratory distress. He has no wheezes. He has no rales. He exhibits no tenderness.   Abdominal: Soft. Bowel sounds are normal. He exhibits no distension. There is no tenderness. There is no guarding. No hernia.   Genitourinary:   Genitourinary Comments: deferred   Musculoskeletal: Normal range of motion. He exhibits no edema.   Neurological: He is alert and oriented to person, place, and time.   Skin: Skin is warm and dry. He is not diaphoretic. No erythema.   Psychiatric: He has a normal mood and affect.  "His behavior is normal. Judgment and thought content normal.   Nursing note and vitals reviewed.      Significant Labs:   A1C:   Recent Labs  Lab 06/14/17  0710   HGBA1C 12.3*     Blood Culture:   Recent Labs  Lab 09/13/17 2121 09/13/17 2125   LABBLOO No Growth to date  No Growth to date No Growth to date  No Growth to date     BMP:   Recent Labs  Lab 09/15/17  0418   *   *   K 3.5   CL 99   CO2 27   BUN 9   CREATININE 0.8   CALCIUM 8.8   MG 1.6     CBC:   Recent Labs  Lab 09/13/17  1833 09/15/17  0418   WBC 10.78 13.11*   HGB 14.5 12.9*   HCT 41.2 37.0*    161     POCT Glucose:   Recent Labs  Lab 09/14/17  0854 09/14/17  2309 09/15/17  0537   POCTGLUCOSE 259* 240* 250*       Significant Imaging: I have reviewed all pertinent imaging results/findings within the past 24 hours.    Assessment/Plan:      * Rectal abscess    Patient had rectal pain and upon evaluation via CT-abd/pelvis, he was found with "[l]ow density focus within the lower rectal wall near the anal sphincter measuring 1.8 x 1.3 x 1.9 cm in size. A rectal abscess is suspected."  He does not meet criteria and has been started on empiric antibiotic therapy with vancomycin and piperacillin/tazobactam.  Blood and urine cultures are pending.  Will consult General Surgery for further recommendations.    S/p I&D 9/14/2017  IV abx one more day and reassess in am to switch to oral if afebrile          Tobacco abuse    Patient was counseled on smoking cessation and he will be provided a nicotine transdermal patch applied while inpatient.  Will provide additional smoking cessation counseling prior to discharge.        Mild protein malnutrition    Will provide protein supplementation with Boost Glucose.        Type 2 diabetes mellitus, uncontrolled    Poorly-controlled on a home regimen of basal-prandial insulin therapy; will provide basal-prandial insulin along with insulin sliding scale.        Essential hypertension    Patient's blood " pressure is well-controlled and is no longer on medications; will provide as-needed clonidine.                      Hepatitis C virus infection    Stable; will encourage continued follow-up with his hepatologist.          VTE Risk Mitigation         Ordered     enoxaparin injection 40 mg  Daily     Route:  Subcutaneous        09/14/17 0452     Medium Risk of VTE  Once      09/14/17 0452              Ariana Montoya MD  Department of Hospital Medicine   [facility]

## 2017-09-15 NOTE — ASSESSMENT & PLAN NOTE
"Patient had rectal pain and upon evaluation via CT-abd/pelvis, he was found with "[l]ow density focus within the lower rectal wall near the anal sphincter measuring 1.8 x 1.3 x 1.9 cm in size. A rectal abscess is suspected."  He does not meet criteria and has been started on empiric antibiotic therapy with vancomycin and piperacillin/tazobactam.  Blood and urine cultures are pending.  Will consult General Surgery for further recommendations.    S/p I&D 9/14/2017  IV abx one more day and reassess in am to switch to oral if afebrile    "

## 2017-09-15 NOTE — PLAN OF CARE
Problem: Patient Care Overview  Goal: Plan of Care Review  Pt free from and injury. Tolerates reinforcement of gauze. Free of fever.

## 2017-09-15 NOTE — HOSPITAL COURSE
Pt admitted with perirectal abscess. I&D 9/14/2017. Aerobic culture grew E.coli. Blood culture negative.Afebrile on van and zosyn. Pain controlled. Pt instructed on sitz bath and cleaning wound. Pt non-compliant with diabetic diet and counseled on poor wound healing if his blood sugars remain elevated. He will continue his home regimen of insulin. Discharge on cipro and follow up general surgery as scheduled for wound check.

## 2017-09-15 NOTE — PROGRESS NOTES
Progress Note    Admit Date: 9/13/2017   LOS: 2 days     SUBJECTIVE:     POD 1  Feeling better.  Less pain.  Tmax 102.1    OBJECTIVE:       Vital Signs Range (Last 24H):  Temp:  [97.9 °F (36.6 °C)-102.1 °F (38.9 °C)]   Pulse:  []   Resp:  [14-24]   BP: (119-164)/()   SpO2:  [92 %-100 %]     I & O (Last 24H):  Intake/Output Summary (Last 24 hours) at 09/15/17 0848  Last data filed at 09/15/17 0623   Gross per 24 hour   Intake          1476.66 ml   Output                3 ml   Net          1473.66 ml         Physical Exam:  NAD  Perianal exam w/out external signs of persistent infection  Packing removed, no drainage.  No induration    Laboratory:  CBC:   Recent Labs  Lab 09/15/17  0418   WBC 13.11*   RBC 4.17*   HGB 12.9*   HCT 37.0*      MCV 89   MCH 30.9   MCHC 34.9           ASSESSMENT/PLAN:     Perirectal abscess s/p I&D  - febrile, continue IV ABX for now until afebrile  - warm showers or sitz baths BID

## 2017-09-16 LAB
ANION GAP SERPL CALC-SCNC: 10 MMOL/L
BASOPHILS # BLD AUTO: 0.01 K/UL
BASOPHILS NFR BLD: 0.1 %
BUN SERPL-MCNC: 7 MG/DL
CALCIUM SERPL-MCNC: 8.9 MG/DL
CHLORIDE SERPL-SCNC: 102 MMOL/L
CO2 SERPL-SCNC: 26 MMOL/L
CREAT SERPL-MCNC: 0.7 MG/DL
DIFFERENTIAL METHOD: ABNORMAL
EOSINOPHIL # BLD AUTO: 0.1 K/UL
EOSINOPHIL NFR BLD: 1.6 %
ERYTHROCYTE [DISTWIDTH] IN BLOOD BY AUTOMATED COUNT: 12.1 %
EST. GFR  (AFRICAN AMERICAN): >60 ML/MIN/1.73 M^2
EST. GFR  (NON AFRICAN AMERICAN): >60 ML/MIN/1.73 M^2
GLUCOSE SERPL-MCNC: 192 MG/DL
HCT VFR BLD AUTO: 38.5 %
HGB BLD-MCNC: 13.3 G/DL
LYMPHOCYTES # BLD AUTO: 1.5 K/UL
LYMPHOCYTES NFR BLD: 17.6 %
MCH RBC QN AUTO: 30.5 PG
MCHC RBC AUTO-ENTMCNC: 34.5 G/DL
MCV RBC AUTO: 88 FL
MONOCYTES # BLD AUTO: 0.9 K/UL
MONOCYTES NFR BLD: 10.1 %
NEUTROPHILS # BLD AUTO: 6.1 K/UL
NEUTROPHILS NFR BLD: 70.6 %
PLATELET # BLD AUTO: 175 K/UL
PMV BLD AUTO: 11.2 FL
POCT GLUCOSE: 235 MG/DL (ref 70–110)
POCT GLUCOSE: 235 MG/DL (ref 70–110)
POCT GLUCOSE: 261 MG/DL (ref 70–110)
POCT GLUCOSE: 266 MG/DL (ref 70–110)
POTASSIUM SERPL-SCNC: 3.5 MMOL/L
RBC # BLD AUTO: 4.36 M/UL
SODIUM SERPL-SCNC: 138 MMOL/L
VANCOMYCIN TROUGH SERPL-MCNC: 6.4 UG/ML
WBC # BLD AUTO: 8.64 K/UL

## 2017-09-16 PROCEDURE — 25000003 PHARM REV CODE 250: Performed by: HOSPITALIST

## 2017-09-16 PROCEDURE — 25000003 PHARM REV CODE 250: Performed by: INTERNAL MEDICINE

## 2017-09-16 PROCEDURE — 25000003 PHARM REV CODE 250: Performed by: PHYSICIAN ASSISTANT

## 2017-09-16 PROCEDURE — 36415 COLL VENOUS BLD VENIPUNCTURE: CPT

## 2017-09-16 PROCEDURE — 80048 BASIC METABOLIC PNL TOTAL CA: CPT

## 2017-09-16 PROCEDURE — 11000001 HC ACUTE MED/SURG PRIVATE ROOM

## 2017-09-16 PROCEDURE — 85025 COMPLETE CBC W/AUTO DIFF WBC: CPT

## 2017-09-16 PROCEDURE — 63600175 PHARM REV CODE 636 W HCPCS: Performed by: PHYSICIAN ASSISTANT

## 2017-09-16 PROCEDURE — 63600175 PHARM REV CODE 636 W HCPCS: Performed by: INTERNAL MEDICINE

## 2017-09-16 PROCEDURE — 94761 N-INVAS EAR/PLS OXIMETRY MLT: CPT

## 2017-09-16 PROCEDURE — 63600175 PHARM REV CODE 636 W HCPCS: Performed by: HOSPITALIST

## 2017-09-16 PROCEDURE — 80202 ASSAY OF VANCOMYCIN: CPT

## 2017-09-16 RX ORDER — OXYCODONE AND ACETAMINOPHEN 10; 325 MG/1; MG/1
1 TABLET ORAL EVERY 6 HOURS PRN
Status: DISCONTINUED | OUTPATIENT
Start: 2017-09-16 | End: 2017-09-17 | Stop reason: HOSPADM

## 2017-09-16 RX ORDER — HYDROMORPHONE HYDROCHLORIDE 2 MG/ML
1 INJECTION, SOLUTION INTRAMUSCULAR; INTRAVENOUS; SUBCUTANEOUS EVERY 4 HOURS PRN
Status: DISCONTINUED | OUTPATIENT
Start: 2017-09-16 | End: 2017-09-17 | Stop reason: HOSPADM

## 2017-09-16 RX ADMIN — PIPERACILLIN SODIUM AND TAZOBACTAM SODIUM 4.5 G: 4; .5 INJECTION, POWDER, LYOPHILIZED, FOR SOLUTION INTRAVENOUS at 09:09

## 2017-09-16 RX ADMIN — PIPERACILLIN SODIUM AND TAZOBACTAM SODIUM 4.5 G: 4; .5 INJECTION, POWDER, LYOPHILIZED, FOR SOLUTION INTRAVENOUS at 01:09

## 2017-09-16 RX ADMIN — HYDROMORPHONE HYDROCHLORIDE 1 MG: 2 INJECTION, SOLUTION INTRAMUSCULAR; INTRAVENOUS; SUBCUTANEOUS at 08:09

## 2017-09-16 RX ADMIN — VANCOMYCIN HYDROCHLORIDE 1250 MG: 1 INJECTION, POWDER, LYOPHILIZED, FOR SOLUTION INTRAVENOUS at 10:09

## 2017-09-16 RX ADMIN — INSULIN ASPART 5 UNITS: 100 INJECTION, SOLUTION INTRAVENOUS; SUBCUTANEOUS at 08:09

## 2017-09-16 RX ADMIN — VANCOMYCIN HYDROCHLORIDE 1500 MG: 1 INJECTION, POWDER, LYOPHILIZED, FOR SOLUTION INTRAVENOUS at 05:09

## 2017-09-16 RX ADMIN — INSULIN ASPART 5 UNITS: 100 INJECTION, SOLUTION INTRAVENOUS; SUBCUTANEOUS at 05:09

## 2017-09-16 RX ADMIN — ENOXAPARIN SODIUM 40 MG: 100 INJECTION SUBCUTANEOUS at 05:09

## 2017-09-16 RX ADMIN — INSULIN ASPART 3 UNITS: 100 INJECTION, SOLUTION INTRAVENOUS; SUBCUTANEOUS at 09:09

## 2017-09-16 RX ADMIN — INSULIN ASPART 5 UNITS: 100 INJECTION, SOLUTION INTRAVENOUS; SUBCUTANEOUS at 11:09

## 2017-09-16 RX ADMIN — INSULIN DETEMIR 25 UNITS: 100 INJECTION, SOLUTION SUBCUTANEOUS at 09:09

## 2017-09-16 RX ADMIN — INSULIN ASPART 6 UNITS: 100 INJECTION, SOLUTION INTRAVENOUS; SUBCUTANEOUS at 05:09

## 2017-09-16 RX ADMIN — INSULIN ASPART 4 UNITS: 100 INJECTION, SOLUTION INTRAVENOUS; SUBCUTANEOUS at 09:09

## 2017-09-16 RX ADMIN — OXYCODONE AND ACETAMINOPHEN 1 TABLET: 5; 325 TABLET ORAL at 05:09

## 2017-09-16 RX ADMIN — ASPIRIN 81 MG: 81 TABLET, COATED ORAL at 09:09

## 2017-09-16 RX ADMIN — OXYCODONE AND ACETAMINOPHEN 1 TABLET: 5; 325 TABLET ORAL at 11:09

## 2017-09-16 RX ADMIN — OXYCODONE AND ACETAMINOPHEN 1 TABLET: 5; 325 TABLET ORAL at 04:09

## 2017-09-16 RX ADMIN — PIPERACILLIN SODIUM AND TAZOBACTAM SODIUM 4.5 G: 4; .5 INJECTION, POWDER, LYOPHILIZED, FOR SOLUTION INTRAVENOUS at 05:09

## 2017-09-16 NOTE — SUBJECTIVE & OBJECTIVE
Interval History: pt reports pain with BM x two but otherwise no complaints, fevers resolved    Review of Systems   Constitutional: Negative for activity change, appetite change, chills, diaphoresis, fatigue, fever and unexpected weight change.   HENT: Negative.    Eyes: Negative.    Respiratory: Negative for cough, chest tightness, shortness of breath and wheezing.    Cardiovascular: Negative for chest pain, palpitations and leg swelling.   Gastrointestinal: Positive for diarrhea. Negative for abdominal distention, abdominal pain, blood in stool, constipation, nausea and vomiting.        Rectal pain   Genitourinary: Negative for dysuria and hematuria.   Musculoskeletal: Negative.    Skin: Negative.    Neurological: Negative for dizziness, seizures, syncope, weakness and light-headedness.   Psychiatric/Behavioral: Negative.      Objective:     Vital Signs (Most Recent):  Temp: 98.1 °F (36.7 °C) (09/16/17 1209)  Pulse: 84 (09/16/17 1209)  Resp: 16 (09/16/17 1209)  BP: 131/79 (09/16/17 1209)  SpO2: 97 % (09/16/17 1209) Vital Signs (24h Range):  Temp:  [97.8 °F (36.6 °C)-99.6 °F (37.6 °C)] 98.1 °F (36.7 °C)  Pulse:  [66-84] 84  Resp:  [16-19] 16  SpO2:  [93 %-97 %] 97 %  BP: (128-151)/(65-79) 131/79     Weight: 87.5 kg (192 lb 14.4 oz)  Body mass index is 26.9 kg/m².    Intake/Output Summary (Last 24 hours) at 09/16/17 1215  Last data filed at 09/16/17 1100   Gross per 24 hour   Intake              610 ml   Output             3300 ml   Net            -2690 ml      Physical Exam   Constitutional: He is oriented to person, place, and time. He appears well-developed and well-nourished. No distress.   HENT:   Head: Normocephalic and atraumatic.   Right Ear: External ear normal.   Left Ear: External ear normal.   Nose: Nose normal.   Eyes: Right eye exhibits no discharge. Left eye exhibits no discharge.   Cardiovascular: Normal rate, regular rhythm, normal heart sounds and intact distal pulses.  Exam reveals no gallop and  no friction rub.    No murmur heard.  Pulmonary/Chest: Effort normal and breath sounds normal. No respiratory distress. He has no wheezes. He has no rales. He exhibits no tenderness.   Abdominal: Soft. Bowel sounds are normal. He exhibits no distension. There is no tenderness. There is no guarding. No hernia.   Genitourinary:   Genitourinary Comments: deferred   Musculoskeletal: Normal range of motion. He exhibits no edema.   Neurological: He is alert and oriented to person, place, and time.   Skin: Skin is warm and dry. He is not diaphoretic. No erythema.   Psychiatric: He has a normal mood and affect. His behavior is normal. Judgment and thought content normal.   Nursing note and vitals reviewed.      Significant Labs:   Blood Culture: No results for input(s): LABBLOO in the last 48 hours.  BMP:   Recent Labs  Lab 09/15/17  0418 09/16/17  0500   * 192*   * 138   K 3.5 3.5   CL 99 102   CO2 27 26   BUN 9 7   CREATININE 0.8 0.7   CALCIUM 8.8 8.9   MG 1.6  --      CBC:   Recent Labs  Lab 09/15/17  0418 09/16/17  0500   WBC 13.11* 8.64   HGB 12.9* 13.3*   HCT 37.0* 38.5*    175     POCT Glucose:   Recent Labs  Lab 09/15/17  1203 09/15/17  2042 09/16/17  0849   POCTGLUCOSE 221* 262* 235*       Significant Imaging: I have reviewed all pertinent imaging results/findings within the past 24 hours.

## 2017-09-16 NOTE — PLAN OF CARE
Problem: Patient Care Overview  Goal: Plan of Care Review  Pt understands and accepts care. Free from falls and injury. No Fever during pm shift. Pt continues to drain red/ pus like drainage from site. Pt washes/ showers as ordered and uses clean technique to apply gauze. Pt AOx4,  Voiding well with slight edema to testicles.

## 2017-09-16 NOTE — PROGRESS NOTES
Progress Note    Admit Date: 9/13/2017   LOS: 3 days     SUBJECTIVE:     POD 2  Afebrile.  Pain continues to improve.      OBJECTIVE:       Vital Signs Range (Last 24H):  Temp:  [97.8 °F (36.6 °C)-99.6 °F (37.6 °C)]   Pulse:  [66-83]   Resp:  [16-19]   BP: (113-151)/(63-78)   SpO2:  [93 %-98 %]     I & O (Last 24H):  Intake/Output Summary (Last 24 hours) at 09/16/17 0858  Last data filed at 09/16/17 0600   Gross per 24 hour   Intake              850 ml   Output             3400 ml   Net            -2550 ml         Physical Exam:  NAD  I&D sites w/ min old bloody drainage.  No induration or purulent drainage    Laboratory:  CBC:   Recent Labs  Lab 09/16/17  0500   WBC 8.64   RBC 4.36*   HGB 13.3*   HCT 38.5*      MCV 88   MCH 30.5   MCHC 34.5     BMP:   Recent Labs  Lab 09/15/17  0418 09/16/17  0500   * 192*   * 138   K 3.5 3.5   CL 99 102   CO2 27 26   BUN 9 7   CREATININE 0.8 0.7   CALCIUM 8.8 8.9   MG 1.6  --            ASSESSMENT/PLAN:     Perirectal abscess s/p I&D  - afebrile  - appears ok to convert to PO ABX  - rec sitz baths or hot showers BID and prn following BMs  - can f/u with me in 2 weeks

## 2017-09-16 NOTE — PROGRESS NOTES
[facility] Hospital Medicine  Progress Note    Patient Name: Nicholas Marshall  MRN: [id number]  Patient Class: IP- Inpatient   Admission Date: 9/13/2017  Length of Stay: 3 days  Attending Physician: Ariana Montoya MD  Primary Care Provider: Kaye Ortiz MD        Subjective:     Principal Problem:Rectal abscess    HPI:  Mr. Nicholas Marshall is a 52 y.o. male with essential hypertension, type 2 diabetes mellitus (HbA1c 12.3% Jun 2017), chronic hepatitis C, mild protein malnutrition, and tobacco abuse who presents to [facility] ED with complaints of rectal pain for one day.  He reports that the pain is worse upon standing and walking, but that it doesn't hurt too bad when he's sitting.  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  He denies any fevers, chills, nausea, vomiting, diaphoresis, nor any palpitations.  He has not had any hematochezia or melena, but says that having a bowel movement hurts a lot.  He has never had similar pains in the past.    Hospital Course:  Pt admitted with perirectal abscess. I&D 9/14/2017. Aerobic culture growing GNR. Afebrile on van and zosyn. Pain controlled. Pt instructed on sitz bath and cleaning wound. Pt wants to wait for final culture to result tomorrow and go home on oral antibiotics.     Interval History: pt reports pain with BM x two but otherwise no complaints, fevers resolved    Review of Systems   Constitutional: Negative for activity change, appetite change, chills, diaphoresis, fatigue, fever and unexpected weight change.   HENT: Negative.    Eyes: Negative.    Respiratory: Negative for cough, chest tightness, shortness of breath and wheezing.    Cardiovascular: Negative for chest pain, palpitations and leg swelling.   Gastrointestinal: Positive for diarrhea. Negative for abdominal distention, abdominal pain, blood in stool, constipation, nausea and vomiting.        Rectal pain   Genitourinary: Negative for dysuria and  hematuria.   Musculoskeletal: Negative.    Skin: Negative.    Neurological: Negative for dizziness, seizures, syncope, weakness and light-headedness.   Psychiatric/Behavioral: Negative.      Objective:     Vital Signs (Most Recent):  Temp: 98.1 °F (36.7 °C) (09/16/17 1209)  Pulse: 84 (09/16/17 1209)  Resp: 16 (09/16/17 1209)  BP: 131/79 (09/16/17 1209)  SpO2: 97 % (09/16/17 1209) Vital Signs (24h Range):  Temp:  [97.8 °F (36.6 °C)-99.6 °F (37.6 °C)] 98.1 °F (36.7 °C)  Pulse:  [66-84] 84  Resp:  [16-19] 16  SpO2:  [93 %-97 %] 97 %  BP: (128-151)/(65-79) 131/79     Weight: 87.5 kg (192 lb 14.4 oz)  Body mass index is 26.9 kg/m².    Intake/Output Summary (Last 24 hours) at 09/16/17 1215  Last data filed at 09/16/17 1100   Gross per 24 hour   Intake              610 ml   Output             3300 ml   Net            -2690 ml      Physical Exam   Constitutional: He is oriented to person, place, and time. He appears well-developed and well-nourished. No distress.   HENT:   Head: Normocephalic and atraumatic.   Right Ear: External ear normal.   Left Ear: External ear normal.   Nose: Nose normal.   Eyes: Right eye exhibits no discharge. Left eye exhibits no discharge.   Cardiovascular: Normal rate, regular rhythm, normal heart sounds and intact distal pulses.  Exam reveals no gallop and no friction rub.    No murmur heard.  Pulmonary/Chest: Effort normal and breath sounds normal. No respiratory distress. He has no wheezes. He has no rales. He exhibits no tenderness.   Abdominal: Soft. Bowel sounds are normal. He exhibits no distension. There is no tenderness. There is no guarding. No hernia.   Genitourinary:   Genitourinary Comments: deferred   Musculoskeletal: Normal range of motion. He exhibits no edema.   Neurological: He is alert and oriented to person, place, and time.   Skin: Skin is warm and dry. He is not diaphoretic. No erythema.   Psychiatric: He has a normal mood and affect. His behavior is normal. Judgment and  "thought content normal.   Nursing note and vitals reviewed.      Significant Labs:   Blood Culture: No results for input(s): LABBLOO in the last 48 hours.  BMP:   Recent Labs  Lab 09/15/17  0418 09/16/17  0500   * 192*   * 138   K 3.5 3.5   CL 99 102   CO2 27 26   BUN 9 7   CREATININE 0.8 0.7   CALCIUM 8.8 8.9   MG 1.6  --      CBC:   Recent Labs  Lab 09/15/17  0418 09/16/17  0500   WBC 13.11* 8.64   HGB 12.9* 13.3*   HCT 37.0* 38.5*    175     POCT Glucose:   Recent Labs  Lab 09/15/17  1203 09/15/17  2042 09/16/17  0849   POCTGLUCOSE 221* 262* 235*       Significant Imaging: I have reviewed all pertinent imaging results/findings within the past 24 hours.    Assessment/Plan:      * Rectal abscess    Patient had rectal pain and upon evaluation via CT-abd/pelvis, he was found with "[l]ow density focus within the lower rectal wall near the anal sphincter measuring 1.8 x 1.3 x 1.9 cm in size. A rectal abscess is suspected."  He does not meet criteria and has been started on empiric antibiotic therapy with vancomycin and piperacillin/tazobactam.  Blood and urine cultures are pending.  Will consult General Surgery for further recommendations.    S/p I&D 9/14/2017  Culture  - GNR- final senst to result tomorrow and dc home on oral antibiotics           Tobacco abuse    Patient was counseled on smoking cessation and he will be provided a nicotine transdermal patch applied while inpatient.  Will provide additional smoking cessation counseling prior to discharge.        Mild protein malnutrition    Will provide protein supplementation with Boost Glucose.        Type 2 diabetes mellitus, uncontrolled    Poorly-controlled on a home regimen of basal-prandial insulin therapy; will provide basal-prandial insulin along with insulin sliding scale.        Essential hypertension    Patient's blood pressure is well-controlled and is no longer on medications; will provide as-needed clonidine.        Arrhythmia    "           Hepatitis C virus infection    Stable; will encourage continued follow-up with his hepatologist.          VTE Risk Mitigation         Ordered     enoxaparin injection 40 mg  Daily     Route:  Subcutaneous        09/14/17 0452     Medium Risk of VTE  Once      09/14/17 0452              Ariana Montoya MD  Department of Hospital Medicine   [facility]

## 2017-09-16 NOTE — ASSESSMENT & PLAN NOTE
"Patient had rectal pain and upon evaluation via CT-abd/pelvis, he was found with "[l]ow density focus within the lower rectal wall near the anal sphincter measuring 1.8 x 1.3 x 1.9 cm in size. A rectal abscess is suspected."  He does not meet criteria and has been started on empiric antibiotic therapy with vancomycin and piperacillin/tazobactam.  Blood and urine cultures are pending.  Will consult General Surgery for further recommendations.    S/p I&D 9/14/2017  Culture  - GNR- final senst to result tomorrow and dc home on oral antibiotics     "

## 2017-09-17 VITALS
SYSTOLIC BLOOD PRESSURE: 136 MMHG | WEIGHT: 192.88 LBS | HEIGHT: 71 IN | BODY MASS INDEX: 27 KG/M2 | HEART RATE: 62 BPM | OXYGEN SATURATION: 98 % | TEMPERATURE: 99 F | RESPIRATION RATE: 18 BRPM | DIASTOLIC BLOOD PRESSURE: 87 MMHG

## 2017-09-17 LAB — BACTERIA SPEC AEROBE CULT: NORMAL

## 2017-09-17 PROCEDURE — 25000003 PHARM REV CODE 250: Performed by: HOSPITALIST

## 2017-09-17 PROCEDURE — 25000003 PHARM REV CODE 250: Performed by: INTERNAL MEDICINE

## 2017-09-17 PROCEDURE — 63600175 PHARM REV CODE 636 W HCPCS: Performed by: HOSPITALIST

## 2017-09-17 PROCEDURE — 63600175 PHARM REV CODE 636 W HCPCS: Performed by: PHYSICIAN ASSISTANT

## 2017-09-17 RX ORDER — CIPROFLOXACIN 500 MG/1
500 TABLET ORAL EVERY 12 HOURS
Qty: 20 TABLET | Refills: 0 | Status: SHIPPED | OUTPATIENT
Start: 2017-09-17 | End: 2017-09-27

## 2017-09-17 RX ORDER — OXYCODONE AND ACETAMINOPHEN 10; 325 MG/1; MG/1
1 TABLET ORAL EVERY 6 HOURS PRN
Qty: 20 TABLET | Refills: 0 | Status: SHIPPED | OUTPATIENT
Start: 2017-09-17 | End: 2017-09-19 | Stop reason: SDUPTHER

## 2017-09-17 RX ADMIN — VANCOMYCIN HYDROCHLORIDE 1500 MG: 1 INJECTION, POWDER, LYOPHILIZED, FOR SOLUTION INTRAVENOUS at 09:09

## 2017-09-17 RX ADMIN — INSULIN ASPART 5 UNITS: 100 INJECTION, SOLUTION INTRAVENOUS; SUBCUTANEOUS at 08:09

## 2017-09-17 RX ADMIN — INSULIN ASPART 5 UNITS: 100 INJECTION, SOLUTION INTRAVENOUS; SUBCUTANEOUS at 12:09

## 2017-09-17 RX ADMIN — OXYCODONE HYDROCHLORIDE AND ACETAMINOPHEN 1 TABLET: 10; 325 TABLET ORAL at 05:09

## 2017-09-17 RX ADMIN — ASPIRIN 81 MG: 81 TABLET, COATED ORAL at 09:09

## 2017-09-17 RX ADMIN — PIPERACILLIN SODIUM AND TAZOBACTAM SODIUM 4.5 G: 4; .5 INJECTION, POWDER, LYOPHILIZED, FOR SOLUTION INTRAVENOUS at 05:09

## 2017-09-17 RX ADMIN — OXYCODONE HYDROCHLORIDE AND ACETAMINOPHEN 1 TABLET: 10; 325 TABLET ORAL at 12:09

## 2017-09-17 RX ADMIN — VANCOMYCIN HYDROCHLORIDE 1500 MG: 1 INJECTION, POWDER, LYOPHILIZED, FOR SOLUTION INTRAVENOUS at 04:09

## 2017-09-17 NOTE — PLAN OF CARE
Problem: Patient Care Overview  Goal: Plan of Care Review  Outcome: Ongoing (interventions implemented as appropriate)  Resting appropriately this shift. IV fluids and antibiotics administered as ordered. Complaint of severe pain adequately relieved with PRN medication. Elevated blood sugar managed with scheduled and sliding scale insulin. Remains free from falls or trauma. Eager for discharge.

## 2017-09-17 NOTE — NURSING
Patient safely discharged home with his son. Peripheral IV removed without difficulty. Dry. Clean gauze applied. All needs met.

## 2017-09-17 NOTE — PROGRESS NOTES
Med surg nurse informed that pt is ready for d/c from [facility] viewpoint..Elodia Hsu RN, BSN, [facility]  9/17/2017

## 2017-09-17 NOTE — PLAN OF CARE
09/17/17 1105   Final Note   Assessment Type Final Discharge Note   Discharge Disposition Home   Hospital Follow Up  Appt(s) scheduled? Yes   Discharge plans and expectations educations in teach back method with documentation complete? Yes   Right Care Referral Info   Post Acute Recommendation No Care

## 2017-09-17 NOTE — DISCHARGE SUMMARY
[facility] Hospital Medicine  Discharge Summary      Patient Name: Nicholas Marshall  MRN: [id number]  Admission Date: 9/13/2017  Hospital Length of Stay: 4 days  Discharge Date and Time:  09/17/2017 10:02 AM  Attending Physician: Ariana Montoya MD   Discharging Provider: Ariana Montoya MD  Primary Care Provider: Kaye Ortiz MD      HPI:   Mr. Nicholas Marshall is a 52 y.o. male with essential hypertension, type 2 diabetes mellitus (HbA1c 12.3% Jun 2017), chronic hepatitis C, mild protein malnutrition, and tobacco abuse who presents to [facility] ED with complaints of rectal pain for one day.  He reports that the pain is worse upon standing and walking, but that it doesn't hurt too bad when he's sitting.  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  He denies any fevers, chills, nausea, vomiting, diaphoresis, nor any palpitations.  He has not had any hematochezia or melena, but says that having a bowel movement hurts a lot.  He has never had similar pains in the past.    Procedure(s) (LRB):  INCISION AND DRAINAGE (I & D)-PERIRECTAL (N/A)      Indwelling Lines/Drains at time of discharge:   Lines/Drains/Airways          No matching active lines, drains, or airways        Hospital Course:   Pt admitted with perirectal abscess. I&D 9/14/2017. Aerobic culture grew E.coli. Blood culture negative.Afebrile on van and zosyn. Pain controlled. Pt instructed on sitz bath and cleaning wound. Pt non-compliant with diabetic diet and counseled on poor wound healing if his blood sugars remain elevated. He will continue his home regimen of insulin. Discharge on cipro and follow up general surgery as scheduled for wound check.      Consults:   Consults         Status Ordering Provider     Inpatient consult to General Surgery  Once     Provider:  Jr Pierre MD    Completed CAIT SÁNCHEZ          Significant Diagnostic Studies: Abdominal CT as read by radiology:  Low density focus  within the lower rectal wall near the anal sphincter measuring 1.8 x 1.3 x 1.9 cm in size. A rectal abscess is suspected.  Lobulated contour of the liver suggestive of underlying cirrhosis.  Bilobed pulmonary nodule versus 2 adjacent pulmonary nodules within the right costophrenic angle each measuring approximately 6 mm in size. Followup CT examination in 6-12 months is recommended.  Bilateral L5 spondylolytic defects with grade 1 anterolisthesis of L5 on S1. Lumbar spondylosis.  Right renal cyst.      Aerobic Bacterial Culture ESCHERICHIA COLI Many        Pending Diagnostic Studies:     None        Final Active Diagnoses:    Diagnosis Date Noted POA    PRINCIPAL PROBLEM:  Rectal abscess [K61.1] 09/13/2017 Yes    Mild protein malnutrition [E44.1] 09/14/2017 Yes     Chronic    Tobacco abuse [Z72.0] 09/14/2017 Yes     Chronic    Type 2 diabetes mellitus, uncontrolled [E11.65] 02/06/2014 Yes     Chronic    Essential hypertension [I10] 01/22/2014 Yes     Chronic    Hepatitis C virus infection [B19.20] 09/16/2013 Yes     Chronic      Problems Resolved During this Admission:    Diagnosis Date Noted Date Resolved POA      No new Assessment & Plan notes have been filed under this hospital service since the last note was generated.  Service: Hospital Medicine      Discharged Condition: good    Disposition: Home or Self Care    Follow Up:  Follow-up Information     Moses Garza MD. Go on 9/26/2017.    Specialty:  General Surgery  Why:  Outpatient Services, Surgery Follow-up Appointment, Please arrive to clinic for 2:00PM  Contact information:  [address] [place] [id number]  [phone]             Kaye Ortiz MD. Go on 9/19/2017.    Specialty:  Internal Medicine  Why:  Outpatient Services, PCP Follow-up Appointment, Please arrive to clinic for 10:00AM  Contact information:  [address] [facility] [id number] [phone]                 Patient Instructions:     Diet Diabetic 2000 Calories  "    Activity as tolerated     Call MD for:  temperature >100.4     Call MD for:  severe uncontrolled pain     Call MD for:  redness, tenderness, or signs of infection (pain, swelling, redness, odor or green/yellow discharge around incision site)       Medications:  Reconciled Home Medications:   Current Discharge Medication List      START taking these medications    Details   ciprofloxacin HCl (CIPRO) 500 MG tablet Take 1 tablet (500 mg total) by mouth every 12 (twelve) hours.  Qty: 20 tablet, Refills: 0      oxycodone-acetaminophen (PERCOCET)  mg per tablet Take 1 tablet by mouth every 6 (six) hours as needed (pain).  Qty: 20 tablet, Refills: 0         CONTINUE these medications which have NOT CHANGED    Details   multivit with min-FA-lycopene (ONE-A-DAY MEN'S) 0.4-600 mg-mcg Tab Take by mouth.      aspirin (ECOTRIN) 81 MG EC tablet Take 81 mg by mouth once daily.      insulin glargine (LANTUS SOLOSTAR) 100 unit/mL (3 mL) InPn pen Inject 15 Units into the skin once daily.  Qty: 5 mL, Refills: 6    Associated Diagnoses: Uncontrolled type 2 diabetes mellitus with hyperglycemia, with long-term current use of insulin      insulin lispro (HUMALOG KWIKPEN) 100 unit/mL InPn pen Inject 18 units into skin with each meal plus correction scale 180-230 +2, 231-280 +4, 281-330 +6, 331-380 +8, >380 +10 as needed.  Qty: 4 Box, Refills: 3    Associated Diagnoses: Type 2 diabetes mellitus without complication; Diabetes mellitus type 2, uncontrolled      insulin needles, disposable, (BD INSULIN PEN NEEDLE UF MINI) 31 x 3/16 " Ndle USE DAILY WITH INSULIN PENS  Qty: 200 each, Refills: 11    Associated Diagnoses: Diabetes mellitus type 2, uncontrolled      lancets Misc BG monitoring 4 times a day. Pt uses one touch verio meter.  Qty: 150 each, Refills: 4         STOP taking these medications       acetone, urine, test (KETONE URINE TEST) Strp Comments:   Reason for Stopping:         blood sugar diagnostic (BLOOD GLUCOSE TEST) " Strp Comments:   Reason for Stopping:         blood sugar diagnostic Strp Comments:   Reason for Stopping:             Time spent on the discharge of patient: 35 minutes    HOS POC IP DISCHARGE SUMMARY    Ariana Montoya MD  Department of Hospital Medicine  [facility]

## 2017-09-17 NOTE — NURSING
Dr. Resendiz notified of complaint of pain 8/10 prior to available administration of PRN analgesic. New orders given.

## 2017-09-18 LAB
BACTERIA BLD CULT: NORMAL
BACTERIA BLD CULT: NORMAL
BACTERIA SPEC ANAEROBE CULT: NORMAL

## 2017-09-19 ENCOUNTER — PATIENT OUTREACH (OUTPATIENT)
Dept: ADMINISTRATIVE | Facility: CLINIC | Age: 52
End: 2017-09-19

## 2017-09-19 ENCOUNTER — LAB VISIT (OUTPATIENT)
Dept: LAB | Facility: HOSPITAL | Age: 52
End: 2017-09-19
Attending: INTERNAL MEDICINE
Payer: COMMERCIAL

## 2017-09-19 ENCOUNTER — OFFICE VISIT (OUTPATIENT)
Dept: INTERNAL MEDICINE | Facility: CLINIC | Age: 52
End: 2017-09-19
Payer: COMMERCIAL

## 2017-09-19 VITALS
WEIGHT: 201.94 LBS | OXYGEN SATURATION: 96 % | HEIGHT: 71 IN | HEART RATE: 67 BPM | DIASTOLIC BLOOD PRESSURE: 82 MMHG | SYSTOLIC BLOOD PRESSURE: 136 MMHG | BODY MASS INDEX: 28.27 KG/M2

## 2017-09-19 DIAGNOSIS — E78.5 HYPERLIPIDEMIA ASSOCIATED WITH TYPE 2 DIABETES MELLITUS: ICD-10-CM

## 2017-09-19 DIAGNOSIS — E11.69 HYPERLIPIDEMIA ASSOCIATED WITH TYPE 2 DIABETES MELLITUS: ICD-10-CM

## 2017-09-19 DIAGNOSIS — I10 ESSENTIAL HYPERTENSION: Chronic | ICD-10-CM

## 2017-09-19 DIAGNOSIS — K61.1 PERIRECTAL ABSCESS: ICD-10-CM

## 2017-09-19 DIAGNOSIS — Z79.4 UNCONTROLLED TYPE 2 DIABETES MELLITUS WITH HYPERGLYCEMIA, WITH LONG-TERM CURRENT USE OF INSULIN: ICD-10-CM

## 2017-09-19 DIAGNOSIS — E11.65 UNCONTROLLED TYPE 2 DIABETES MELLITUS WITH HYPERGLYCEMIA, WITH LONG-TERM CURRENT USE OF INSULIN: ICD-10-CM

## 2017-09-19 LAB
ALBUMIN SERPL BCP-MCNC: 3.1 G/DL
ALP SERPL-CCNC: 88 U/L
ALT SERPL W/O P-5'-P-CCNC: 135 U/L
ANION GAP SERPL CALC-SCNC: 9 MMOL/L
AST SERPL-CCNC: 108 U/L
BILIRUB SERPL-MCNC: 0.4 MG/DL
BUN SERPL-MCNC: 6 MG/DL
CALCIUM SERPL-MCNC: 9.3 MG/DL
CHLORIDE SERPL-SCNC: 102 MMOL/L
CHOLEST SERPL-MCNC: 123 MG/DL
CHOLEST/HDLC SERPL: 4.4 {RATIO}
CO2 SERPL-SCNC: 26 MMOL/L
CREAT SERPL-MCNC: 0.8 MG/DL
EST. GFR  (AFRICAN AMERICAN): >60 ML/MIN/1.73 M^2
EST. GFR  (NON AFRICAN AMERICAN): >60 ML/MIN/1.73 M^2
ESTIMATED AVG GLUCOSE: 283 MG/DL
GLUCOSE SERPL-MCNC: 323 MG/DL
HBA1C MFR BLD HPLC: 11.5 %
HDLC SERPL-MCNC: 28 MG/DL
HDLC SERPL: 22.8 %
LDLC SERPL CALC-MCNC: 73.4 MG/DL
NONHDLC SERPL-MCNC: 95 MG/DL
POTASSIUM SERPL-SCNC: 4.5 MMOL/L
PROT SERPL-MCNC: 6.6 G/DL
SODIUM SERPL-SCNC: 137 MMOL/L
TRIGL SERPL-MCNC: 108 MG/DL

## 2017-09-19 PROCEDURE — 80053 COMPREHEN METABOLIC PANEL: CPT

## 2017-09-19 PROCEDURE — 99999 PR PBB SHADOW E&M-EST. PATIENT-LVL III: CPT | Mod: PBBFAC,,, | Performed by: INTERNAL MEDICINE

## 2017-09-19 PROCEDURE — 36415 COLL VENOUS BLD VENIPUNCTURE: CPT

## 2017-09-19 PROCEDURE — 83036 HEMOGLOBIN GLYCOSYLATED A1C: CPT

## 2017-09-19 PROCEDURE — 80061 LIPID PANEL: CPT

## 2017-09-19 PROCEDURE — 99214 OFFICE O/P EST MOD 30 MIN: CPT | Mod: S$GLB,,, | Performed by: INTERNAL MEDICINE

## 2017-09-19 RX ORDER — OXYCODONE AND ACETAMINOPHEN 10; 325 MG/1; MG/1
1 TABLET ORAL EVERY 6 HOURS PRN
Qty: 20 TABLET | Refills: 0 | Status: SHIPPED | OUTPATIENT
Start: 2017-09-19 | End: 2017-10-23 | Stop reason: ALTCHOICE

## 2017-09-19 RX ORDER — INSULIN GLARGINE 100 [IU]/ML
20 INJECTION, SOLUTION SUBCUTANEOUS DAILY
Qty: 5 ML | Refills: 6 | COMMUNITY
Start: 2017-09-19 | End: 2020-04-17 | Stop reason: SDUPTHER

## 2017-09-19 NOTE — PROGRESS NOTES
Subjective:       Patient ID: Nicholas Marshall is a 52 y.o. male.    Chief Complaint: Hospital Follow Up (went into hospital wed night & had surgery for an infection in the rectum.)    HPI   52 y.o. male presents for a hospital follow up visit. I have reviewed discharge summary as well as all relevant laboratory and pathology results and imaging.     Patient was admitted 9/13/17 to 9/17/17  He was admitted with perirectal abscess and treated with I&D and vancomycin and Zosyn.  Discharge was Cipro and general surgery follow-up for wound check.  He will see Dr. Garza on 9/26 in [place].   He started 10 day course of cipro on 9/17.   Having to take percocet 10 every 6 hours.     Still with some discomfort and drainage.     He has diabetes that is previously been poorly controlled.  He has had trouble affording his insulin.  At our last visit in June of this year we restarted Lantus 15 units daily and continue Humalog.  A1c was 12.3% in June.  Reports lowest BG has been 225 and highest 365.     Family and/or Caretaker present at visit?  No.  Diagnostic tests reviewed/disposition: No diagnosic tests pending after this hospitalization.  Disease/illness education: reviewed  Home health/community services discussion/referrals: Patient does not have home health established from hospital visit.  They do not need home health.  If needed, we will set up home health for the patient.   Establishment or re-establishment of referral orders for community resources: No other necessary community resources.   Discussion with other health care providers: No discussion with other health care providers necessary.     Review of Systems   Constitutional: Negative for fever.   HENT: Negative.    Eyes: Negative.    Respiratory: Negative for shortness of breath.    Cardiovascular: Negative for chest pain and leg swelling.   Gastrointestinal: Negative for abdominal pain, diarrhea, nausea and vomiting.   Genitourinary: Negative.   "  Musculoskeletal: Negative for arthralgias.   Skin: Negative for rash.   Psychiatric/Behavioral: Negative.        Objective:   /82 (BP Location: Right arm, Patient Position: Sitting, BP Method: Medium (Manual))   Pulse 67   Ht 5' 11" (1.803 m)   Wt 91.6 kg (201 lb 15.1 oz)   SpO2 96%   BMI 28.17 kg/m²      Physical Exam   Constitutional: He is oriented to person, place, and time. He appears well-developed and well-nourished. No distress.   HENT:   Head: Normocephalic and atraumatic.   Cardiovascular: Normal rate and regular rhythm.    No murmur heard.  Pulmonary/Chest: Effort normal. No respiratory distress. He has no wheezes. He has no rales.   Neurological: He is alert and oriented to person, place, and time.   Skin: Skin is warm and dry. He is not diaphoretic.   Left perirectal area with 5 mm open wound without purulence, erythema nor drainage.   Psychiatric: He has a normal mood and affect. His behavior is normal.       Assessment:       1. Uncontrolled type 2 diabetes mellitus with other skin complication, with long-term current use of insulin    2. Hyperlipidemia associated with type 2 diabetes mellitus    3. Essential hypertension    4. Perirectal abscess    5. Uncontrolled type 2 diabetes mellitus with hyperglycemia, with long-term current use of insulin        Plan:       Nicholas was seen today for hospital follow up.    Diagnoses and all orders for this visit:    Uncontrolled type 2 diabetes mellitus with other skin complication, with long-term current use of insulin  Increase lantus from 15 units to 20 units.   Check a1c and cmp today.   Scheduled endocrinology.  He is very overdue to be seen    Hyperlipidemia associated with type 2 diabetes mellitus  Check lipids today -he had a small amount of cheese with crackers.    Essential hypertension  At goal continue current medicine  Perirectal abscess  -     oxycodone-acetaminophen (PERCOCET)  mg per tablet; Take 1 tablet by mouth every 6 (six) " hours as needed (pain).  Keep appointment with surgeon as scheduled later this month.  Complete course of Cipro.  No signs of active infection currently.  Bloodsugar control is extremely important in healing, discussed

## 2017-09-19 NOTE — Clinical Note
Please forward this important TCC information to your provider in order to maximize the post discharge care delivery of this patient.  C3 nurse spoke with Nicholas Marshall  for a TCC post hospital discharge follow up call. The patient has a scheduled HOSFU appointment with Kaye Ortiz MD on 9/19 @ 1000. Respectfully, Cintia Rosales RN  [facility]   [email or web address]     Please do not reply to this message, as this inbox is not routinely monitored.

## 2017-09-19 NOTE — PATIENT INSTRUCTIONS
Abscess [Incision & Drainage]  An abscess (sometimes called a boil) occurs when bacteria get trapped under the skin and begin to grow. Pus forms inside the abscess as the body responds to the bacteria. An abscess can occur with an insect bite, ingrown hair, blocked oil gland, pimple, cyst, or puncture wound.  Treatment of your abscess has required an incision to drain the pus. If the abscess pocket was large, a gauze packing may have been inserted. This will need to be removed and possibly replaced on your next visit. Antibiotics are not required in the treatment of a simple abscess, unless the infection is spreading into the skin around the wound (known as cellulitis).  Healing of the wound will take about one to two weeks depending on the size of the abscess. Healthy tissue will grow from the bottom and sides of the opening until it seals over.  Home Care:  The wound may drain for the first two days. Cover the wound with a clean dry dressing. If the dressing becomes soaked with blood or pus, change it.  If a gauze packing was placed inside the abscess cavity, you may be advised to remove it yourself. You may do this in the shower. Once the packing is removed, you should wash the area in the shower or bath 3 to 4 times a day, until the skin opening has closed.  If you were prescribed antibiotics, take them as directed until they are all gone.  You may use acetaminophen (Tylenol) or ibuprofen (Motrin, Advil) to control pain, unless another pain medicine was prescribed. [ NOTE: If you have liver disease or ever had a stomach ulcer, talk with your doctor before using these medicines.]  Follow Up  with your doctor as advised by our staff. If a gauze packing was inserted in your wound, it should be removed in 1-2 days. Check your wound every day for the signs of worsening infection listed below.  Get Prompt Medical Attention  if any of the following occur:  Increasing redness or swelling  Red streaks in the skin  leading away from the wound  Increasing local pain or swelling  Continued pus draining from the wound two days after treatment  Fever of 100.4ºF (38ºC) or higher, or as directed by your healthcare provider  © 3685-4039 [employer]err[employer], [address] [place] [address], [place] [address]. All rights reserved. This information is not intended as a substitute for professional medical care. Always follow your healthcare professional's instructions.

## 2017-10-09 ENCOUNTER — OFFICE VISIT (OUTPATIENT)
Dept: ENDOCRINOLOGY | Facility: CLINIC | Age: 52
End: 2017-10-09
Payer: COMMERCIAL

## 2017-10-09 VITALS
BODY MASS INDEX: 26.67 KG/M2 | WEIGHT: 190.5 LBS | SYSTOLIC BLOOD PRESSURE: 129 MMHG | DIASTOLIC BLOOD PRESSURE: 80 MMHG | HEART RATE: 73 BPM | HEIGHT: 71 IN

## 2017-10-09 DIAGNOSIS — Z79.4 UNCONTROLLED TYPE 2 DIABETES MELLITUS WITH HYPERGLYCEMIA, WITH LONG-TERM CURRENT USE OF INSULIN: Primary | Chronic | ICD-10-CM

## 2017-10-09 DIAGNOSIS — I10 ESSENTIAL HYPERTENSION: Chronic | ICD-10-CM

## 2017-10-09 DIAGNOSIS — E55.9 VITAMIN D INSUFFICIENCY: ICD-10-CM

## 2017-10-09 DIAGNOSIS — E11.65 UNCONTROLLED TYPE 2 DIABETES MELLITUS WITH HYPERGLYCEMIA, WITH LONG-TERM CURRENT USE OF INSULIN: Primary | Chronic | ICD-10-CM

## 2017-10-09 DIAGNOSIS — G62.9 POLYNEUROPATHY: ICD-10-CM

## 2017-10-09 DIAGNOSIS — E11.69 HYPERLIPIDEMIA ASSOCIATED WITH TYPE 2 DIABETES MELLITUS: ICD-10-CM

## 2017-10-09 DIAGNOSIS — E78.5 HYPERLIPIDEMIA ASSOCIATED WITH TYPE 2 DIABETES MELLITUS: ICD-10-CM

## 2017-10-09 DIAGNOSIS — Z71.9 HEALTH COUNSELING: ICD-10-CM

## 2017-10-09 PROCEDURE — 99215 OFFICE O/P EST HI 40 MIN: CPT | Mod: S$GLB,,, | Performed by: NURSE PRACTITIONER

## 2017-10-09 PROCEDURE — 99999 PR PBB SHADOW E&M-EST. PATIENT-LVL V: CPT | Mod: PBBFAC,,, | Performed by: NURSE PRACTITIONER

## 2017-10-09 NOTE — PROGRESS NOTES
CC: This 52 y.o.  male presents for management of T2 DM.     HPI: Pt was diagnosed with T2DM in 2013. Began treatment of orals. Began insulin therapy in 2014.   Reports dietary changes after dx allowed him 100#.  Follows with hepatology for + HCV (1980s).     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     Last seen 12/2015. H/o infrequent follow-up.   A1c marked elevation.  Checks BG every other day when he gets home after shift.  Reports BGs 280-350.  Denies hypoglycemia.     Admitted 9/2017- elías-rectal abscess     CURRENT DM MEDS: Lantus 20U AM. Misses Lantus every other day.  Humalog 20U AC. Misses 1 shot daily.   Pays 400$/ 90 day supply for MDI.    He states he can only use insulin pens. States syringes cause problems at work.  States the demands of his job complicate DM self management.    DIET/ MEAL PATTERN: 3 meals a day, snacks at night on nuts, cheesy crackers, slim Jacoby. Drinks regular coke, 2-3 12oz per day though he used to drink 4L/day.      EXERCISE: cycling, 30 minutes daily. Stays active at work.     STANDARDS OF CARE:  Eye exam: 02/2014, no DR  Podiatry: 2014  Wants Ophth and Pod referral.   On ASA  No ACEi. MCR negative  No statin    REVIEW OF SYSTEMS  General: no weakness or fatigue.   Eyes: no visual disturbances or intermittent blurry vision.   Cardiac: no chest pain or palpitations.  Respiratory: no cough or dyspnea.   GI: no abdominal pain or nausea.   : urination overnight, reports PSA elevation, polyps removed 1.5 years ago.   Skin: no rashes or itching.   Neuro: + numbness/ burning to feet.   Endocrine: no polyphagia; no polyphagia, + increased thirst.   Denies h/o pancreatitis, personal h/o thyroid CA, or MEN2. Reports mother had thyroid CA, unsure if MTC.    Hemoglobin A1C   Date Value Ref Range Status   09/19/2017 11.5 (H) 4.0 - 5.6 % Final     Comment:     According to ADA guidelines, hemoglobin A1c <7.0% represents  optimal control in non-pregnant diabetic  patients. Different  metrics may apply to specific patient populations.   Standards of Medical Care in Diabetes-2016.  For the purpose of screening for the presence of diabetes:  <5.7%     Consistent with the absence of diabetes  5.7-6.4%  Consistent with increasing risk for diabetes   (prediabetes)  >or=6.5%  Consistent with diabetes  Currently, no consensus exists for use of hemoglobin A1c  for diagnosis of diabetes for children.  This Hemoglobin A1c assay has significant interference with fetal   hemoglobin   (HbF). The results are invalid for patients with abnormal amounts of   HbF,   including those with known Hereditary Persistence   of Fetal Hemoglobin. Heterozygous hemoglobin variants (HbAS, HbAC,   HbAD, HbAE, HbA2) do not significantly interfere with this assay;   however, presence of multiple variants in a sample may impact the %   interference.     06/14/2017 12.3 (H) 4.0 - 5.6 % Final     Comment:     According to ADA guidelines, hemoglobin A1c <7.0% represents  optimal control in non-pregnant diabetic patients. Different  metrics may apply to specific patient populations.   Standards of Medical Care in Diabetes-2016.  For the purpose of screening for the presence of diabetes:  <5.7%     Consistent with the absence of diabetes  5.7-6.4%  Consistent with increasing risk for diabetes   (prediabetes)  >or=6.5%  Consistent with diabetes  Currently, no consensus exists for use of hemoglobin A1c  for diagnosis of diabetes for children.  This Hemoglobin A1c assay has significant interference with fetal   hemoglobin   (HbF). The results are invalid for patients with abnormal amounts of   HbF,   including those with known Hereditary Persistence   of Fetal Hemoglobin. Heterozygous hemoglobin variants (HbAS, HbAC,   HbAD, HbAE, HbA2) do not significantly interfere with this assay;   however, presence of multiple variants in a sample may impact the %   interference.     02/15/2016 11.1 (H) 4.5 - 6.2 % Final          "Chemistry        Component Value Date/Time     09/19/2017 1035    K 4.5 09/19/2017 1035     09/19/2017 1035    CO2 26 09/19/2017 1035    BUN 6 09/19/2017 1035    CREATININE 0.8 09/19/2017 1035     (H) 09/19/2017 1035        Component Value Date/Time    CALCIUM 9.3 09/19/2017 1035    ALKPHOS 88 09/19/2017 1035     (H) 09/19/2017 1035     (H) 09/19/2017 1035    BILITOT 0.4 09/19/2017 1035        Lab Results   Component Value Date    CHOL 123 09/19/2017    CHOL 172 06/23/2017    CHOL 146 02/15/2016     Lab Results   Component Value Date    HDL 28 (L) 09/19/2017    HDL 40 06/23/2017    HDL 34 (L) 02/15/2016     Lab Results   Component Value Date    LDLCALC 73.4 09/19/2017    LDLCALC 114.0 06/23/2017    LDLCALC 91.8 02/15/2016     Lab Results   Component Value Date    TRIG 108 09/19/2017    TRIG 90 06/23/2017    TRIG 101 02/15/2016     Lab Results   Component Value Date    CHOLHDL 22.8 09/19/2017    CHOLHDL 23.3 06/23/2017    CHOLHDL 23.3 02/15/2016     Lab Results   Component Value Date    TSH 0.969 02/15/2016     Lab Results   Component Value Date    MICALBCREAT Unable to calculate 06/19/2017     Vit D, 25-Hydroxy   Date Value Ref Range Status   02/15/2016 22 (L) 30 - 96 ng/mL Final     Comment:     Vitamin D deficiency.........<10 ng/mL                              Vitamin D insufficiency......10-29 ng/mL       Vitamin D sufficiency........> or equal to 30 ng/mL  Vitamin D toxicity............>100 ng/mL       PHYSICAL EXAMINATION  /80 (BP Location: Left arm, Patient Position: Sitting)   Pulse 73   Ht 5' 11" (1.803 m)   Wt 86.4 kg (190 lb 8 oz)   BMI 26.57 kg/m²   Constitutional: Appears well, no distress  Neck: Supple, trachea midline. No thyromegaly.  Respiratory: CTA without wheezes, even and unlabored.  Cardiovascular: RRR   Lymph: DP pulses  2+ bilaterally; no edema.   Skin: warm and dry; no injection site reactions, no acanthosis nigracans observed.  Neuro:patient alert " and cooperative.  Feet: skin and nails in good condition, + calluses, monofilament sensation intact bilaterally.     ASSESSMENT and PLAN:        1. Diabetes mellitus type 2, with peripheral neuropathy, uncontrolled  - -Reviewed A1c and BG goals.   Discussed DM course, progression, and the need for good BG control to prevent or allay long-term complications.   Discussed better self management.   Do not believe he needs more insulin as much as he needs to take the insulin as prescribed.  Discussed nonadherence at length and methods to improve compliance.   Recommend alarm setting for basal dosing. Recommend interpersonal accountability and he will d/w his son.   He declines case management for additional assistance.     For now, to continue MDI, focusing on adherence.   Discussed VGO as he may benefit greatly given missed doses. He is interested. Benefits inquiry form completed.    If cost is an issue, consider Relion Reg as renal fn nl and stable.     Recommend he inquire if +FH on mother of thyroid CA is MTC.    Discussed better BG and self management for neuropathy sx.  Podiatry referral.     Overdue for Ophth. A1c chronically high.   Referral to Ophth.     -Reviewed proper hypoglycemia management.     - Reviewed DKA s/sx, ketone strips, and when to present to ER.    - Consider mixed insulin if VGO not an option. May do better with reduced injection frequency.   Discussed importance of not missing doses if only taking twice daily.   - He is NOT interested in vials.     - BG AC/HS. Discussed consistency in BG monitoring to allow for safe titration of medications and to allow for correction scale dosing.       - RTC 2 months. Will follow VGO progress.     - No metformin 2/2 elevated transaminitis.       Essential hypertension  - Controlled .  Consider ACEi if +MCR      HLD - LDL and trigs acceptable.   Continue statin.         Vit D insufficiency Vit D 2016.  Repeat RTC.     Orders Placed This Encounter   Procedures     Hemoglobin A1c     Standing Status:   Future     Standing Expiration Date:   12/8/2018    TSH     Standing Status:   Future     Standing Expiration Date:   12/8/2018    Vitamin D     Standing Status:   Future     Standing Expiration Date:   12/8/2018    Ambulatory referral to Optometry     Referral Priority:   Routine     Referral Type:   Vision (Optometry)     Referral Reason:   Specialty Services Required     Requested Specialty:   Optometry     Number of Visits Requested:   1    Ambulatory referral to Podiatry     Referral Priority:   Routine     Referral Type:   Consultation     Referral Reason:   Specialty Services Required     Requested Specialty:   Podiatry     Number of Visits Requested:   1      Return in about 2 months (around 12/9/2017).

## 2017-10-16 ENCOUNTER — TELEPHONE (OUTPATIENT)
Dept: ENDOCRINOLOGY | Facility: CLINIC | Age: 52
End: 2017-10-16

## 2017-10-23 ENCOUNTER — OFFICE VISIT (OUTPATIENT)
Dept: PODIATRY | Facility: CLINIC | Age: 52
End: 2017-10-23
Payer: COMMERCIAL

## 2017-10-23 ENCOUNTER — OFFICE VISIT (OUTPATIENT)
Dept: OPTOMETRY | Facility: CLINIC | Age: 52
End: 2017-10-23
Payer: COMMERCIAL

## 2017-10-23 VITALS
WEIGHT: 181 LBS | RESPIRATION RATE: 18 BRPM | DIASTOLIC BLOOD PRESSURE: 101 MMHG | HEART RATE: 91 BPM | HEIGHT: 71 IN | SYSTOLIC BLOOD PRESSURE: 153 MMHG | BODY MASS INDEX: 25.34 KG/M2

## 2017-10-23 DIAGNOSIS — E11.49 OTHER DIABETIC NEUROLOGICAL COMPLICATION ASSOCIATED WITH TYPE 2 DIABETES MELLITUS: ICD-10-CM

## 2017-10-23 DIAGNOSIS — E11.69 HYPERLIPIDEMIA ASSOCIATED WITH TYPE 2 DIABETES MELLITUS: ICD-10-CM

## 2017-10-23 DIAGNOSIS — E78.5 HYPERLIPIDEMIA ASSOCIATED WITH TYPE 2 DIABETES MELLITUS: ICD-10-CM

## 2017-10-23 DIAGNOSIS — E11.65 UNCONTROLLED TYPE 2 DIABETES MELLITUS WITH HYPERGLYCEMIA, WITH LONG-TERM CURRENT USE OF INSULIN: Primary | Chronic | ICD-10-CM

## 2017-10-23 DIAGNOSIS — H25.13 NS (NUCLEAR SCLEROSIS), BILATERAL: ICD-10-CM

## 2017-10-23 DIAGNOSIS — E11.3299 NPDR (NONPROLIFERATIVE DIABETIC RETINOPATHY): ICD-10-CM

## 2017-10-23 DIAGNOSIS — I10 ESSENTIAL HYPERTENSION: Primary | Chronic | ICD-10-CM

## 2017-10-23 DIAGNOSIS — H52.4 PRESBYOPIA: ICD-10-CM

## 2017-10-23 DIAGNOSIS — Z79.4 UNCONTROLLED TYPE 2 DIABETES MELLITUS WITH HYPERGLYCEMIA, WITH LONG-TERM CURRENT USE OF INSULIN: Primary | Chronic | ICD-10-CM

## 2017-10-23 PROCEDURE — 92250 FUNDUS PHOTOGRAPHY W/I&R: CPT | Mod: S$GLB,,, | Performed by: OPTOMETRIST

## 2017-10-23 PROCEDURE — 92004 COMPRE OPH EXAM NEW PT 1/>: CPT | Mod: S$GLB,,, | Performed by: OPTOMETRIST

## 2017-10-23 PROCEDURE — 92015 DETERMINE REFRACTIVE STATE: CPT | Mod: S$GLB,,, | Performed by: OPTOMETRIST

## 2017-10-23 PROCEDURE — 99999 PR PBB SHADOW E&M-EST. PATIENT-LVL I: CPT | Mod: PBBFAC,,, | Performed by: OPTOMETRIST

## 2017-10-23 PROCEDURE — 99203 OFFICE O/P NEW LOW 30 MIN: CPT | Mod: S$GLB,,, | Performed by: PODIATRIST

## 2017-10-23 PROCEDURE — 99999 PR PBB SHADOW E&M-EST. PATIENT-LVL III: CPT | Mod: PBBFAC,,, | Performed by: PODIATRIST

## 2017-10-23 RX ORDER — GABAPENTIN 300 MG/1
300 CAPSULE ORAL NIGHTLY
Qty: 30 CAPSULE | Refills: 11 | Status: SHIPPED | OUTPATIENT
Start: 2017-10-23 | End: 2019-10-21

## 2017-10-23 NOTE — PROGRESS NOTES
HPI     Patient's age: 52 y.o.  Approximate date of last eye examination:  2/12/14  Name of last eye doctor seen: Dr. Haney    Pt states that he is here for his diabetic eye check, eye do a lot of   tearing.    Wears glasses? Readers + 1.75     Wears CLs?:  no            Headaches?  no  Eye pain/discomfort?  no                                                                                     Flashes?  no  Floaters?  no  Diplopia/Double vision?  no    Patient's Ocular History:         Any eye surgeries? no         Family history of eye disease?  no    Significant patient medical history:         1. Diabetes?  yes       If yes, IDDM or NIDDM? IDDM   2. HBP?  none                 ! OTC eyedrops currently using:  none   ! Prescription eye meds currently using:  None    Hemoglobin A1C       Date                     Value               Ref Range             Status                09/19/2017               11.5 (H)            4.0 - 5.6 %           Final                     06/14/2017               12.3 (H)            4.0 - 5.6 %           Final                     02/15/2016               11.1 (H)            4.5 - 6.2 %           Final                   Last edited by Jany Hinson MA on 10/23/2017 10:42 AM. (History)            Assessment /Plan     For exam results, see Encounter Report.    Essential hypertension  Hyperlipidemia associated with type 2 diabetes mellitus  NPDR (nonproliferative diabetic retinopathy)  -     Color Fundus Photography - OU - Both Eyes   Dot blot hemes OU, CWS OS   Discussed importance of A1c control   mOnitor yearly    Repeat Fundus photos next year    NS (nuclear sclerosis), bilateral   Mild, monitor    Choroidal nevus OS   Monitor    Presbyopia   Rx specs

## 2017-10-23 NOTE — LETTER
October 23, 2017      SALTY Haque  [address]  [facility] [address]           [facility]  [address]  [facility] [address]  Phone: [phone]  Fax: [phone]          Patient: Nicholas Marshall   MR Number: [id number]   YOB: 1965   Date of Visit: 10/23/2017       Dear Ivis Olguin:    Thank you for referring Nicholas Marshall to me for evaluation. Attached you will find relevant portions of my assessment and plan of care.    If you have questions, please do not hesitate to call me. I look forward to following Nicholas Marshall along with you.    Sincerely,    Laura Haney, OD    Enclosure  CC:  No Recipients    If you would like to receive this communication electronically, please contact [email or web address] or [phone] to request more information on [employer] Link access.    For providers and/or their staff who would like to refer a patient to [facility], please contact us through our one-stop-shop provider referral line, [facility], at [phone].    If you feel you have received this communication in error or would no longer like to receive these types of communications, please e-mail [email or web address]

## 2017-10-23 NOTE — LETTER
October 23, 2017      SALTY Haque  [address]  [facility] [address]           [facility]  [address]  [facility] [address]  Phone: [phone]          Patient: Nicholas Marshall   MR Number: [id number]   YOB: 1965   Date of Visit: 10/23/2017       Dear Ivis Olguin:    Thank you for referring Nicholas Marshall to me for evaluation. Attached you will find relevant portions of my assessment and plan of care.    If you have questions, please do not hesitate to call me. I look forward to following Nicholas Marshall along with you.    Sincerely,    Mary Alice Sanchez, DARIO    Enclosure  CC:  No Recipients    If you would like to receive this communication electronically, please contact [email or web address] or [phone] to request more information on [employer] Link access.    For providers and/or their staff who would like to refer a patient to [facility], please contact us through our one-stop-shop provider referral line, [facility], at [phone].    If you feel you have received this communication in error or would no longer like to receive these types of communications, please e-mail [email or web address]

## 2017-10-30 ENCOUNTER — PATIENT MESSAGE (OUTPATIENT)
Dept: CASE MANAGEMENT | Facility: HOSPITAL | Age: 52
End: 2017-10-30

## 2017-11-14 ENCOUNTER — TELEPHONE (OUTPATIENT)
Dept: HEPATOLOGY | Facility: CLINIC | Age: 52
End: 2017-11-14

## 2017-11-14 NOTE — TELEPHONE ENCOUNTER
Patient scheduled for HCC screening on 12/14/17 but JESENIA Cherry will not be working in department at that time..  Unable to reach him by phone or LVM.  Letter sent to him stating that labs, ultrasound and visit with JESENIA Truong had to moved to different dates; appt notices for December mailed to him.  I asked that he call us back if changes were required.

## 2017-12-11 ENCOUNTER — HOSPITAL ENCOUNTER (OUTPATIENT)
Dept: RADIOLOGY | Facility: HOSPITAL | Age: 52
Discharge: HOME | End: 2017-12-11
Attending: INTERNAL MEDICINE
Payer: COMMERCIAL

## 2017-12-11 DIAGNOSIS — B18.2 CHRONIC HEPATITIS C WITHOUT HEPATIC COMA: ICD-10-CM

## 2017-12-11 DIAGNOSIS — K74.69 OTHER CIRRHOSIS OF LIVER: ICD-10-CM

## 2017-12-11 PROCEDURE — 76700 US EXAM ABDOM COMPLETE: CPT | Mod: 26,59,, | Performed by: RADIOLOGY

## 2017-12-11 PROCEDURE — 93975 VASCULAR STUDY: CPT | Mod: 26,,, | Performed by: RADIOLOGY

## 2017-12-11 PROCEDURE — 93975 VASCULAR STUDY: CPT | Mod: TC

## 2017-12-11 PROCEDURE — 76700 US EXAM ABDOM COMPLETE: CPT | Mod: TC

## 2017-12-12 ENCOUNTER — TELEPHONE (OUTPATIENT)
Dept: HEPATOLOGY | Facility: CLINIC | Age: 52
End: 2017-12-12

## 2017-12-12 NOTE — TELEPHONE ENCOUNTER
hcc screen reviewed.   US - no liver mass    Lab Results   Component Value Date     (H) 12/11/2017     (H) 12/11/2017    CREATININE 0.9 12/11/2017    BILITOT 0.9 12/11/2017    INR 1.2 12/11/2017    AFP 3.6 12/11/2017    ALBUMIN 3.7 12/11/2017     MELD-Na score: 8 at 12/11/2017  7:59 AM  MELD score: 8 at 12/11/2017  7:59 AM  Calculated from:  Serum Creatinine: 0.9 mg/dL (Rounded to 1) at 12/11/2017  7:59 AM  Serum Sodium: 140 mmol/L (Rounded to 137) at 12/11/2017  7:59 AM  Total Bilirubin: 0.9 mg/dL (Rounded to 1) at 12/11/2017  7:59 AM  INR(ratio): 1.2 at 12/11/2017  7:59 AM  Age: 52 years    Please call pt.   Labs show elevated liver enzymes, but good liver function. Liver tumor marker is normal   Ultrasound shows evidence of cirrhosis but no liver mass.   His blood sugar on lab was highly elevated, pls correlate with home reads and review with pcp  He should keep 12/19 appt for continued care & discuss new treatment options for hcv .   His next ultrasound and hcc labs should be recalled for 6-2018

## 2017-12-20 ENCOUNTER — TELEPHONE (OUTPATIENT)
Dept: HEPATOLOGY | Facility: CLINIC | Age: 52
End: 2017-12-20

## 2017-12-20 NOTE — TELEPHONE ENCOUNTER
Nicholas Marshall was a no show for their 12/20/2017 appointment. Please attempt to reschedule visit. Let me know if unable to reschedule visit.     Thanks

## 2017-12-20 NOTE — TELEPHONE ENCOUNTER
Attempt to reach pt however left VM on pts phone to have him call our office back to reschedule his recently missed appt. Will also send out letter to the pt today.

## 2018-01-24 ENCOUNTER — CLINICAL SUPPORT (OUTPATIENT)
Dept: INFECTIOUS DISEASES | Facility: CLINIC | Age: 53
End: 2018-01-24
Payer: COMMERCIAL

## 2018-01-24 DIAGNOSIS — B18.2 CHRONIC HEPATITIS C WITHOUT HEPATIC COMA: ICD-10-CM

## 2018-01-24 DIAGNOSIS — K74.69 OTHER CIRRHOSIS OF LIVER: ICD-10-CM

## 2018-01-24 PROCEDURE — 90471 IMMUNIZATION ADMIN: CPT | Mod: S$GLB,,, | Performed by: INTERNAL MEDICINE

## 2018-01-24 PROCEDURE — 90636 HEP A/HEP B VACC ADULT IM: CPT | Mod: S$GLB,,, | Performed by: INTERNAL MEDICINE

## 2018-01-24 NOTE — PROGRESS NOTES
Pt received the final dose of his Hepatitis A/B Twinrix vaccination. Pt tolerated the injection well. Pt left the unit in NAD.

## 2018-04-19 NOTE — PROGRESS NOTES
Subjective:      Patient ID: Nicholas Marshall is a 52 y.o. male.    Chief Complaint: PCP (Kaye Ortiz MD 9/19/17); Diabetic Foot Exam; Foot Problem (sharp pain ); and Numbness (tingling, burning bad )    Nicholas is a 52 y.o. male who presents to the clinic upon referral from Dr. Olguin  for evaluation and treatment of diabetic feet. Nicholas has a past medical history of Diabetes mellitus; Diabetes mellitus type 2 in nonobese (9/16/2013); Hepatitis C virus infection (9/16/2013); Hypertension; Obesity (9/16/2013); and Splenomegaly. Patient relates no major problem with feet. Only complaints today consist of  yearly DM foot examination  And numbness to feet     PCP: Kaye Ortiz MD    Date Last Seen by PCP:   Chief Complaint   Patient presents with    PCP     Kaye Ortiz MD 9/19/17    Diabetic Foot Exam    Foot Problem     sharp pain     Numbness     tingling, burning bad          Current shoe gear: Casual shoes    Hemoglobin A1C   Date Value Ref Range Status   09/19/2017 11.5 (H) 4.0 - 5.6 % Final     Comment:     According to ADA guidelines, hemoglobin A1c <7.0% represents  optimal control in non-pregnant diabetic patients. Different  metrics may apply to specific patient populations.   Standards of Medical Care in Diabetes-2016.  For the purpose of screening for the presence of diabetes:  <5.7%     Consistent with the absence of diabetes  5.7-6.4%  Consistent with increasing risk for diabetes   (prediabetes)  >or=6.5%  Consistent with diabetes  Currently, no consensus exists for use of hemoglobin A1c  for diagnosis of diabetes for children.  This Hemoglobin A1c assay has significant interference with fetal   hemoglobin   (HbF). The results are invalid for patients with abnormal amounts of   HbF,   including those with known Hereditary Persistence   of Fetal Hemoglobin. Heterozygous hemoglobin variants (HbAS, HbAC,   HbAD, HbAE, HbA2) do not significantly interfere with this assay;    however, presence of multiple variants in a sample may impact the %   interference.     06/14/2017 12.3 (H) 4.0 - 5.6 % Final     Comment:     According to ADA guidelines, hemoglobin A1c <7.0% represents  optimal control in non-pregnant diabetic patients. Different  metrics may apply to specific patient populations.   Standards of Medical Care in Diabetes-2016.  For the purpose of screening for the presence of diabetes:  <5.7%     Consistent with the absence of diabetes  5.7-6.4%  Consistent with increasing risk for diabetes   (prediabetes)  >or=6.5%  Consistent with diabetes  Currently, no consensus exists for use of hemoglobin A1c  for diagnosis of diabetes for children.  This Hemoglobin A1c assay has significant interference with fetal   hemoglobin   (HbF). The results are invalid for patients with abnormal amounts of   HbF,   including those with known Hereditary Persistence   of Fetal Hemoglobin. Heterozygous hemoglobin variants (HbAS, HbAC,   HbAD, HbAE, HbA2) do not significantly interfere with this assay;   however, presence of multiple variants in a sample may impact the %   interference.     02/15/2016 11.1 (H) 4.5 - 6.2 % Final                 Patient Active Problem List   Diagnosis    Hepatitis C virus infection    Arrhythmia    Obesity    Essential hypertension    Smokeless tobacco use    Type 2 diabetes mellitus, uncontrolled    Hypogonadism, male    Colon cancer screening    Hepatic cirrhosis    Hyperlipidemia associated with type 2 diabetes mellitus    Rectal abscess    Mild protein malnutrition    Tobacco abuse    Peripheral neuropathy       Current Outpatient Prescriptions on File Prior to Visit   Medication Sig Dispense Refill    aspirin (ECOTRIN) 81 MG EC tablet Take 81 mg by mouth once daily.      insulin glargine (LANTUS SOLOSTAR) 100 unit/mL (3 mL) InPn pen Inject 20 Units into the skin once daily. 5 mL 6    insulin lispro (HUMALOG KWIKPEN) 100 unit/mL InPn pen Inject 18  "units into skin with each meal plus correction scale 180-230 +2, 231-280 +4, 281-330 +6, 331-380 +8, >380 +10 as needed. 4 Box 3    insulin needles, disposable, (BD INSULIN PEN NEEDLE UF MINI) 31 x 3/16 " Ndle USE DAILY WITH INSULIN PENS 200 each 11    lancets Misc BG monitoring 4 times a day. Pt uses one touch verio meter. 150 each 4    multivit with min-FA-lycopene (ONE-A-DAY MEN'S) 0.4-600 mg-mcg Tab Take by mouth.      ONETOUCH DELICA LANCETS 33 gauge Misc       [DISCONTINUED] oxycodone-acetaminophen (PERCOCET)  mg per tablet Take 1 tablet by mouth every 6 (six) hours as needed (pain). 20 tablet 0     No current facility-administered medications on file prior to visit.        Review of patient's allergies indicates:  No Known Allergies    Past Surgical History:   Procedure Laterality Date    CHOLECYSTECTOMY      2003    COLONOSCOPY N/A 1/25/2016    Procedure: COLONOSCOPY;  Surgeon: Freddy Almendarez MD;  Location: [facility] ([address] [place] [address]);  Service: Endoscopy;  Laterality: N/A;    inguinal hernia  2001    left       Family History   Problem Relation Age of Onset    Ovarian cancer Mother     Coronary artery disease Father      heart transplant    Hypertension Father     Asthma Daughter     Asthma Son     Asthma Grandchild     Diabetes Sister     Ovarian cancer Sister     Alzheimer's disease Paternal Grandfather     Amblyopia Neg Hx     Blindness Neg Hx     Cataracts Neg Hx     Glaucoma Neg Hx     Macular degeneration Neg Hx     Retinal detachment Neg Hx     Strabismus Neg Hx     Prostate cancer Neg Hx     Colon cancer Neg Hx        Social History     Social History    Marital status:      Spouse name: N/A    Number of children: N/A    Years of education: N/A     Occupational History     [employer]     Social History Main Topics    Smoking status: Current Every Day Smoker     Types: Vaping with nicotine    Smokeless tobacco: Former User     Types: Snuff      Comment: " "dips since 16 years - 1 can daily down from 2 cans daily    Alcohol use No      Comment:  last alcohol Easter 2013    Drug use: No    Sexual activity: Yes     Partners: Female     Other Topics Concern    Not on file     Social History Narrative    No narrative on file         ]  Review of Systems   Constitution: Negative for chills, decreased appetite and fever.   Cardiovascular: Negative for leg swelling.   Musculoskeletal: Negative for arthritis, joint pain, joint swelling and myalgias.   Gastrointestinal: Negative for nausea and vomiting.   Neurological: Positive for numbness and paresthesias. Negative for loss of balance.           Objective:       Vitals:    10/23/17 1002   BP: (!) 153/101   Pulse: 91   Resp: 18   Weight: 82.1 kg (181 lb)   Height: 5' 11" (1.803 m)   PainSc:   3   PainLoc: Foot        Physical Exam   Constitutional: He is oriented to person, place, and time. He appears well-developed and well-nourished.   Cardiovascular: Intact distal pulses.    Pulses:       Dorsalis pedis pulses are 2+ on the right side, and 2+ on the left side.        Posterior tibial pulses are 2+ on the right side, and 2+ on the left side.   dorsalis pedis and posterior tibial pulses are palpable bilaterally. Capillary refill time is within normal limits. + pedal hair growth          Musculoskeletal: Normal range of motion. He exhibits no edema or tenderness.   Adequate joint range of motion without pain, limitation, nor crepitation Bilateral feet and ankle joints. Muscle strength is 5/5 in all groups bilaterally.         Feet:   Right Foot:   Protective Sensation: 5 sites tested. 5 sites sensed.   Left Foot:   Protective Sensation: 5 sites tested. 5 sites sensed.   Neurological: He is alert and oriented to person, place, and time. He has normal strength. No sensory deficit.   [place] 5.07 monofilament is intact bilateral feet.      Skin: Skin is warm, dry and intact. No lesion and no rash noted. No erythema. "   Nails 1-5 bilaterally  are normal in length, thickness, coloration and are non dystrophic.      Psychiatric: He has a normal mood and affect. His behavior is normal.   Vitals reviewed.            Assessment:       Encounter Diagnoses   Name Primary?    Uncontrolled type 2 diabetes mellitus with hyperglycemia, with long-term current use of insulin Yes    Other diabetic neurological complication associated with type 2 diabetes mellitus          Plan:       Nicholas was seen today for pcp, diabetic foot exam, foot problem and numbness.    Diagnoses and all orders for this visit:    Uncontrolled type 2 diabetes mellitus with hyperglycemia, with long-term current use of insulin    Other diabetic neurological complication associated with type 2 diabetes mellitus    Other orders  -     gabapentin (NEURONTIN) 300 MG capsule; Take 1 capsule (300 mg total) by mouth every evening.      I counseled the patient on his conditions, their implications and medical management.    - Shoe inspection. Diabetic Foot Education. Patient reminded of the importance of good nutrition and blood sugar control to help prevent podiatric complications of diabetes. Patient instructed on proper foot hygeine. We discussed wearing proper shoe gear, daily foot inspections, never walking without protective shoe gear, caution putting sharp instruments to feet     - Discussed DM foot care:  Wear comfortable, proper fitting shoes. Wash feet daily. Dry well. After drying, apply moisturizer to feet (no lotion to webspaces). Inspect feet daily for skin breaks, blisters, swelling, or redness. Wear cotton socks (preferably white)  Change socks every day. Do NOT walk barefoot. Do NOT use heating pads or warm/hot water soaks     - Discussed importance of daily moisturizer to the feet such as Gold bonds diabetic foot cream    - Patient is low risk for developing lower extremity issues secondary to diabetes    - Discussed the  importance of maintaining  low blood  sugar levels, and the direct affect elevated blood sugars have on progression of neuropathic symptoms    - Discussed treatment options for neuropathic foot pain. Advised to keep BS under tight glycemic control .  Rx Gabapentin prescribed. Potential risk including but not limited to  dizziness, somnolence, ataxia. If averse effects occur patient should discontinue medicationn and notify myself or their PCP immediately. Medication can be titrated with doctor supervision to reach a therapeutic level    - RTC in  1 year for a diabetic foot exam  or sooner if problems         I will SWITCH the dose or number of times a day I take the medications listed below when I get home from the hospital:  None

## 2018-06-27 ENCOUNTER — PATIENT OUTREACH (OUTPATIENT)
Dept: ADMINISTRATIVE | Facility: HOSPITAL | Age: 53
End: 2018-06-27

## 2018-06-27 NOTE — PROGRESS NOTES
Pt due for annual PCP appointment and labs.  Attempted to contact pt, left voice message requesting a return call.

## 2018-06-29 DIAGNOSIS — E11.9 TYPE 2 DIABETES MELLITUS WITHOUT COMPLICATION: ICD-10-CM

## 2018-08-24 DIAGNOSIS — E11.9 TYPE 2 DIABETES MELLITUS WITHOUT COMPLICATION: ICD-10-CM

## 2018-10-24 ENCOUNTER — TELEPHONE (OUTPATIENT)
Dept: HEPATOLOGY | Facility: CLINIC | Age: 53
End: 2018-10-24

## 2018-10-24 ENCOUNTER — TELEPHONE (OUTPATIENT)
Dept: INTERNAL MEDICINE | Facility: CLINIC | Age: 53
End: 2018-10-24

## 2018-10-24 ENCOUNTER — HOSPITAL ENCOUNTER (EMERGENCY)
Facility: HOSPITAL | Age: 53
Discharge: HOME | End: 2018-10-25
Attending: EMERGENCY MEDICINE
Payer: COMMERCIAL

## 2018-10-24 DIAGNOSIS — L03.031 PARONYCHIA OF GREAT TOE, RIGHT: Primary | ICD-10-CM

## 2018-10-24 DIAGNOSIS — R73.9 HYPERGLYCEMIA: ICD-10-CM

## 2018-10-24 DIAGNOSIS — K74.60 CIRRHOSIS OF LIVER WITHOUT ASCITES, UNSPECIFIED HEPATIC CIRRHOSIS TYPE: Primary | ICD-10-CM

## 2018-10-24 DIAGNOSIS — M79.676 PAIN OF GREAT TOE, UNSPECIFIED LATERALITY: ICD-10-CM

## 2018-10-24 LAB
ALBUMIN SERPL BCP-MCNC: 3.9 G/DL
ALP SERPL-CCNC: 90 U/L
ALT SERPL W/O P-5'-P-CCNC: 55 U/L
ANION GAP SERPL CALC-SCNC: 12 MMOL/L
AST SERPL-CCNC: 32 U/L
B-OH-BUTYR BLD STRIP-SCNC: 0.1 MMOL/L
BACTERIA #/AREA URNS HPF: NORMAL /HPF
BASOPHILS # BLD AUTO: 0.03 K/UL
BASOPHILS NFR BLD: 0.2 %
BILIRUB SERPL-MCNC: 0.7 MG/DL
BILIRUB UR QL STRIP: NEGATIVE
BUN SERPL-MCNC: 7 MG/DL
CALCIUM SERPL-MCNC: 9.8 MG/DL
CHLORIDE SERPL-SCNC: 100 MMOL/L
CLARITY UR: CLEAR
CO2 SERPL-SCNC: 21 MMOL/L
COLOR UR: ABNORMAL
CREAT SERPL-MCNC: 1 MG/DL
DIFFERENTIAL METHOD: ABNORMAL
EOSINOPHIL # BLD AUTO: 0.1 K/UL
EOSINOPHIL NFR BLD: 1 %
ERYTHROCYTE [DISTWIDTH] IN BLOOD BY AUTOMATED COUNT: 12.7 %
EST. GFR  (AFRICAN AMERICAN): >60 ML/MIN/1.73 M^2
EST. GFR  (NON AFRICAN AMERICAN): >60 ML/MIN/1.73 M^2
GLUCOSE SERPL-MCNC: 342 MG/DL (ref 70–110)
GLUCOSE SERPL-MCNC: 446 MG/DL (ref 70–110)
GLUCOSE SERPL-MCNC: 457 MG/DL
GLUCOSE UR QL STRIP: ABNORMAL
HCT VFR BLD AUTO: 47.3 %
HGB BLD-MCNC: 16.4 G/DL
HGB UR QL STRIP: NEGATIVE
KETONES UR QL STRIP: NEGATIVE
LACTATE SERPL-SCNC: 3 MMOL/L
LEUKOCYTE ESTERASE UR QL STRIP: NEGATIVE
LYMPHOCYTES # BLD AUTO: 1.7 K/UL
LYMPHOCYTES NFR BLD: 12.9 %
MCH RBC QN AUTO: 31.2 PG
MCHC RBC AUTO-ENTMCNC: 34.7 G/DL
MCV RBC AUTO: 90 FL
MICROSCOPIC COMMENT: NORMAL
MONOCYTES # BLD AUTO: 1 K/UL
MONOCYTES NFR BLD: 7.5 %
NEUTROPHILS # BLD AUTO: 10.3 K/UL
NEUTROPHILS NFR BLD: 78.4 %
NITRITE UR QL STRIP: NEGATIVE
PH UR STRIP: 6 [PH] (ref 5–8)
PLATELET # BLD AUTO: 153 K/UL
PMV BLD AUTO: 11.8 FL
POCT GLUCOSE: 400 MG/DL (ref 70–110)
POCT GLUCOSE: 446 MG/DL (ref 70–110)
POTASSIUM SERPL-SCNC: 3.8 MMOL/L
PROT SERPL-MCNC: 7.5 G/DL
PROT UR QL STRIP: NEGATIVE
RBC # BLD AUTO: 5.25 M/UL
SODIUM SERPL-SCNC: 133 MMOL/L
SP GR UR STRIP: >1.03 (ref 1–1.03)
URN SPEC COLLECT METH UR: ABNORMAL
UROBILINOGEN UR STRIP-ACNC: NEGATIVE EU/DL
WBC # BLD AUTO: 13.19 K/UL
YEAST URNS QL MICRO: NORMAL

## 2018-10-24 PROCEDURE — 87040 BLOOD CULTURE FOR BACTERIA: CPT | Mod: 59

## 2018-10-24 PROCEDURE — 83605 ASSAY OF LACTIC ACID: CPT

## 2018-10-24 PROCEDURE — 81000 URINALYSIS NONAUTO W/SCOPE: CPT

## 2018-10-24 PROCEDURE — 25000003 PHARM REV CODE 250: Performed by: EMERGENCY MEDICINE

## 2018-10-24 PROCEDURE — 96375 TX/PRO/DX INJ NEW DRUG ADDON: CPT

## 2018-10-24 PROCEDURE — 96374 THER/PROPH/DIAG INJ IV PUSH: CPT

## 2018-10-24 PROCEDURE — 99284 EMERGENCY DEPT VISIT MOD MDM: CPT | Mod: 25

## 2018-10-24 PROCEDURE — 80053 COMPREHEN METABOLIC PANEL: CPT

## 2018-10-24 PROCEDURE — 85025 COMPLETE CBC W/AUTO DIFF WBC: CPT

## 2018-10-24 PROCEDURE — 82010 KETONE BODYS QUAN: CPT

## 2018-10-24 PROCEDURE — 96361 HYDRATE IV INFUSION ADD-ON: CPT

## 2018-10-24 RX ADMIN — SODIUM CHLORIDE 1000 ML: 0.9 INJECTION, SOLUTION INTRAVENOUS at 10:10

## 2018-10-24 NOTE — TELEPHONE ENCOUNTER
Spoke with patients wife. She states pt is going to go to ED to get foot evaluated. Advised to call back if anything changes

## 2018-10-24 NOTE — TELEPHONE ENCOUNTER
----- Message from Johnathan Ordonez sent at 10/24/2018  1:07 PM CDT -----  Contact: patient   Patient calling to schedule an follow up/lab appointment          Please call [phone]        Thanks!

## 2018-10-24 NOTE — TELEPHONE ENCOUNTER
----- Message from Linda Sandoval sent at 10/24/2018  1:24 PM CDT -----  Contact: self/[phone]      ----- Message -----  From: Lizette Celeste  Sent: 10/24/2018   1:11 PM  To: Diana Restrepo Staff    .1 Patient would like to get medical advice.  Symptoms (please be specific): ingrowing nail on the right big toe  How long has patient had these symptoms:  Pharmacy name and phone#:[facility] [phone] (Phone)  [phone] (Fax)  Any drug allergies:no  Comments: I offered an appointment with another physician, and through the same date, but patient stated that is red, but he want to wait until next week, and want to be seen by PCP. Patient would like to get medical advice.

## 2018-10-24 NOTE — TELEPHONE ENCOUNTER
Attempt made to reach patient to scheduled f/u visit with JESENIA Truong along with hcc testing.  LVM asking that he give us a call back.

## 2018-10-25 VITALS
HEIGHT: 71 IN | BODY MASS INDEX: 26.46 KG/M2 | OXYGEN SATURATION: 98 % | WEIGHT: 189 LBS | DIASTOLIC BLOOD PRESSURE: 62 MMHG | SYSTOLIC BLOOD PRESSURE: 132 MMHG | RESPIRATION RATE: 16 BRPM | TEMPERATURE: 99 F | HEART RATE: 100 BPM

## 2018-10-25 LAB
POCT GLUCOSE: 245 MG/DL (ref 70–110)
POCT GLUCOSE: 342 MG/DL (ref 70–110)

## 2018-10-25 PROCEDURE — 63600175 PHARM REV CODE 636 W HCPCS: Performed by: EMERGENCY MEDICINE

## 2018-10-25 PROCEDURE — 87075 CULTR BACTERIA EXCEPT BLOOD: CPT

## 2018-10-25 PROCEDURE — 25000003 PHARM REV CODE 250: Performed by: EMERGENCY MEDICINE

## 2018-10-25 PROCEDURE — 87070 CULTURE OTHR SPECIMN AEROBIC: CPT

## 2018-10-25 PROCEDURE — 11730 AVULSION NAIL PLATE SIMPLE 1: CPT

## 2018-10-25 PROCEDURE — 87186 SC STD MICRODIL/AGAR DIL: CPT

## 2018-10-25 PROCEDURE — 87077 CULTURE AEROBIC IDENTIFY: CPT | Mod: 59

## 2018-10-25 RX ORDER — CLINDAMYCIN HYDROCHLORIDE 150 MG/1
600 CAPSULE ORAL
Status: COMPLETED | OUTPATIENT
Start: 2018-10-25 | End: 2018-10-25

## 2018-10-25 RX ORDER — OXYCODONE AND ACETAMINOPHEN 10; 325 MG/1; MG/1
1 TABLET ORAL EVERY 4 HOURS PRN
Qty: 8 TABLET | Refills: 0 | Status: SHIPPED | OUTPATIENT
Start: 2018-10-25 | End: 2020-04-17

## 2018-10-25 RX ORDER — ONDANSETRON 2 MG/ML
4 INJECTION INTRAMUSCULAR; INTRAVENOUS
Status: COMPLETED | OUTPATIENT
Start: 2018-10-25 | End: 2018-10-25

## 2018-10-25 RX ORDER — LIDOCAINE HYDROCHLORIDE AND EPINEPHRINE 10; 10 MG/ML; UG/ML
10 INJECTION, SOLUTION INFILTRATION; PERINEURAL ONCE
Status: COMPLETED | OUTPATIENT
Start: 2018-10-25 | End: 2018-10-25

## 2018-10-25 RX ORDER — KETOROLAC TROMETHAMINE 10 MG/1
10 TABLET, FILM COATED ORAL EVERY 6 HOURS PRN
Qty: 20 TABLET | Refills: 1 | Status: SHIPPED | OUTPATIENT
Start: 2018-10-25

## 2018-10-25 RX ORDER — CLINDAMYCIN HYDROCHLORIDE 300 MG/1
300 CAPSULE ORAL EVERY 6 HOURS
Qty: 40 CAPSULE | Refills: 0 | Status: SHIPPED | OUTPATIENT
Start: 2018-10-25 | End: 2018-11-04

## 2018-10-25 RX ORDER — LORAZEPAM 2 MG/ML
2 INJECTION INTRAMUSCULAR
Status: COMPLETED | OUTPATIENT
Start: 2018-10-25 | End: 2018-10-25

## 2018-10-25 RX ORDER — CIPROFLOXACIN 500 MG/1
500 TABLET ORAL
Status: COMPLETED | OUTPATIENT
Start: 2018-10-25 | End: 2018-10-25

## 2018-10-25 RX ORDER — CIPROFLOXACIN 500 MG/1
500 TABLET ORAL EVERY 12 HOURS
Qty: 20 TABLET | Refills: 0 | Status: SHIPPED | OUTPATIENT
Start: 2018-10-25 | End: 2018-11-04

## 2018-10-25 RX ORDER — KETOROLAC TROMETHAMINE 30 MG/ML
30 INJECTION, SOLUTION INTRAMUSCULAR; INTRAVENOUS
Status: COMPLETED | OUTPATIENT
Start: 2018-10-25 | End: 2018-10-25

## 2018-10-25 RX ORDER — MORPHINE SULFATE 4 MG/ML
4 INJECTION, SOLUTION INTRAMUSCULAR; INTRAVENOUS
Status: COMPLETED | OUTPATIENT
Start: 2018-10-25 | End: 2018-10-25

## 2018-10-25 RX ADMIN — MORPHINE SULFATE 4 MG: 4 INJECTION, SOLUTION INTRAMUSCULAR; INTRAVENOUS at 12:10

## 2018-10-25 RX ADMIN — KETOROLAC TROMETHAMINE 30 MG: 30 INJECTION, SOLUTION INTRAMUSCULAR at 12:10

## 2018-10-25 RX ADMIN — CLINDAMYCIN HYDROCHLORIDE 600 MG: 150 CAPSULE ORAL at 12:10

## 2018-10-25 RX ADMIN — LIDOCAINE HYDROCHLORIDE AND EPINEPHRINE 10 ML: 10; 10 INJECTION, SOLUTION INFILTRATION; PERINEURAL at 01:10

## 2018-10-25 RX ADMIN — CIPROFLOXACIN HYDROCHLORIDE 500 MG: 500 TABLET, FILM COATED ORAL at 12:10

## 2018-10-25 RX ADMIN — BACITRACIN ZINC, NEOMYCIN SULFATE, AND POLYMYXIN B SULFATE 1 EACH: 400; 3.5; 5 OINTMENT TOPICAL at 01:10

## 2018-10-25 RX ADMIN — LORAZEPAM 2 MG: 2 INJECTION INTRAMUSCULAR; INTRAVENOUS at 12:10

## 2018-10-25 RX ADMIN — ONDANSETRON HYDROCHLORIDE 4 MG: 2 INJECTION INTRAMUSCULAR; INTRAVENOUS at 12:10

## 2018-10-25 RX ADMIN — INSULIN HUMAN 6 UNITS: 100 INJECTION, SOLUTION PARENTERAL at 12:10

## 2018-10-25 NOTE — ED PROVIDER NOTES
"Encounter Date: 10/24/2018     SORT: This is a 53 y.o. male with DM, Hep C, HTN who presents for emergent consideration of right great toe pain.  Initial exam notable for possible ingrown toenail with erythema and tenderness of the proximal aspect near the cuticle. Last dose of insulin was this morning. POCT glucose 446. Patient will be moved to a room when one is available. Orders placed.    PRISCILLA Del Cid, CHANDLER     SCRIBE #1 NOTE: I, Komal Raines, am scribing for, and in the presence of,  Taylor Rendon MD. I have scribed the following portions of the note - Other sections scribed: HPI, ROS, PE.       History     Chief Complaint   Patient presents with    Toe Pain     Pt states "I have redness around my big toe. I noticed it earlier this evening". PMH of DM     CC: Toe Pain    HPI: This is an emergent evaluation of a 54 y/o male with DM and hep C who presents to the ED for acute onset R great toe pain and redness that began yesterday morning. Pt rates pain as severe (8/10) and constant. Pt clipped his toenails a couple of days ago but denies unusual trauma. [place], and he is on his feet for 12 hr shifts overnight. Pt called PCP yesterday about symptoms and told to come to the ED if symptoms worsened.     Pt notes that he takes Gabapentin 300 mg PO QID (1200 mg total), Humalog 18 units with each meal and by sliding scale as needed, and Lantus 20 units QD. Pt ate around 7:30 PM last night.     Pt has a history of rectal staph infection which he attributes to [employer] seafood. He was admitted here 9/13/17-9/17/17 and had an I&D by surgery.     PMHx: DM, neuropathy, Hepatitis C, HTN, and Splenomegaly      The history is provided by the patient. No  was used.     Review of patient's allergies indicates:  No Known Allergies  Past Medical History:   Diagnosis Date    Diabetes mellitus     Diabetes mellitus type 2 in nonobese 9/16/2013 "    Hepatitis C virus infection 9/16/2013    Hypertension     Obesity 9/16/2013    Intentional 75 ib weight loss    Splenomegaly     Staph infection     in rectum from seafood     Past Surgical History:   Procedure Laterality Date    CHOLECYSTECTOMY      2003    COLONOSCOPY N/A 1/25/2016    Procedure: COLONOSCOPY;  Surgeon: Freddy Almendarez MD;  Location: [facility] (4TH FLR);  Service: Endoscopy;  Laterality: N/A;    COLONOSCOPY N/A 1/25/2016    Performed by Freddy [facility] at [facility] (4[facility] F[facility])    INCISION AND DRAINAGE (I & D)-PERIRECTAL N/A 9/14/2017    Performed by Moses [facility] at [facility]    inguinal hernia  2001    left     Family History   Problem Relation Age of Onset    Ovarian cancer Mother     Coronary artery disease Father         heart transplant    Hypertension Father     Asthma Daughter     Asthma Son     Asthma Grandchild     Diabetes Sister     Ovarian cancer Sister     Alzheimer's disease Paternal Grandfather     Amblyopia Neg Hx     Blindness Neg Hx     Cataracts Neg Hx     Glaucoma Neg Hx     Macular degeneration Neg Hx     Retinal detachment Neg Hx     Strabismus Neg Hx     Prostate cancer Neg Hx     Colon cancer Neg Hx      Social History     Tobacco Use    Smoking status: Current Every Day Smoker     Types: Vaping with nicotine, Vaping w/o nicotine    Smokeless tobacco: Former User     Types: Snuff   Substance Use Topics    Alcohol use: No     Alcohol/week: 0.0 oz     Comment: occ    Drug use: No     Review of Systems   Constitutional: Negative for appetite change and fever.   HENT: Negative for rhinorrhea and sore throat.    Eyes: Negative for visual disturbance.   Respiratory: Negative for cough and shortness of breath.    Cardiovascular: Negative for chest pain.   Gastrointestinal: Negative for abdominal pain.   Genitourinary: Negative for dysuria.   Musculoskeletal: Positive for arthralgias (to R great toe). Negative for gait problem.   Skin:  Positive for color change (to R great toe). Negative for rash.   Neurological: Negative for syncope.       Physical Exam     Initial Vitals [10/24/18 2214]   BP Pulse Resp Temp SpO2   (!) 184/102 (!) 119 18 97.9 °F (36.6 °C) 95 %      MAP       --         Physical Exam    Nursing note and vitals reviewed.  Constitutional: He appears well-developed and well-nourished. He is not diaphoretic.   Patient is awake and alert. Non-toxic appearing adult male.    HENT:   Head: Normocephalic and atraumatic.   Mouth/Throat: Oropharynx is clear and moist.   Eyes: Conjunctivae and EOM are normal. Pupils are equal, round, and reactive to light.   Neck: Normal range of motion. Neck supple.   Cardiovascular: Normal rate, regular rhythm, normal heart sounds and intact distal pulses.   No murmur heard.  Pulmonary/Chest: Breath sounds normal. No respiratory distress. He has no wheezes. He has no rhonchi. He has no rales.   Abdominal: Soft. Bowel sounds are normal. He exhibits no distension. There is no tenderness. There is no rebound and no guarding.   Musculoskeletal: Normal range of motion. He exhibits no edema or tenderness.        Right foot: Right great toe: Exhibits tenderness (Patient has a brownish color bruising localized to the base of nail. Little fluctuance and pus coming from the medial distal aspect. No streaking up foot. No foot edema.).   Neurological: He is alert and oriented to person, place, and time. He has normal strength.   Moving all extremities   Skin: Skin is warm and dry.   Psychiatric: He has a normal mood and affect.         ED Course   I & D - Incision and Drainage  Date/Time: 10/25/2018 1:29 AM  Performed by: Taylor Rendon MD  Authorized by: Taylor Rendon MD   Indications for incision and drainage: paronychia.  Body area: lower extremity  Location details: right big toe  Anesthesia: digital block    Anesthesia:  Local Anesthetic: lidocaine 1% with epinephrine  Anesthetic total: 10 mL  Comments: I  utilized curved hemostats and tissue scissors to remove the medial aspect of the patient's right great toenail and the associated medial nailbed. I expressed moderate purulent pus from the medial nail margin. Cultures sent.        Labs Reviewed   CBC W/ AUTO DIFFERENTIAL - Abnormal; Notable for the following components:       Result Value    WBC 13.19 (*)     MCH 31.2 (*)     Gran # (ANC) 10.3 (*)     Gran% 78.4 (*)     Lymph% 12.9 (*)     All other components within normal limits   COMPREHENSIVE METABOLIC PANEL - Abnormal; Notable for the following components:    Sodium 133 (*)     CO2 21 (*)     Glucose 457 (*)     ALT 55 (*)     All other components within normal limits    Narrative:     Glucose critical result(s) called and verbal readback obtained   from:Brianne Lacy , 10/24/2018 23:29   URINALYSIS, REFLEX TO URINE CULTURE - Abnormal; Notable for the following components:    Specific Gravity, UA >1.030 (*)     Glucose, UA 3+ (*)     All other components within normal limits    Narrative:     Preferred Collection Type->Urine, Clean Catch   LACTIC ACID, PLASMA - Abnormal; Notable for the following components:    Lactate (Lactic Acid) 3.0 (*)     All other components within normal limits   POCT GLUCOSE - Abnormal; Notable for the following components:    POCT Glucose 446 (*)     All other components within normal limits   POCT GLUCOSE - Abnormal; Notable for the following components:    POCT Glucose 400 (*)     All other components within normal limits   POCT GLUCOSE - Abnormal; Notable for the following components:    POCT Glucose 342 (*)     All other components within normal limits   POCT GLUCOSE - Abnormal; Notable for the following components:    POCT Glucose 245 (*)     All other components within normal limits   CULTURE, BLOOD   CULTURE, BLOOD   CULTURE, ANAEROBIC   CULTURE, AEROBIC  (SPECIFY SOURCE)   BETA - HYDROXYBUTYRATE, SERUM   URINALYSIS MICROSCOPIC    Narrative:     Preferred Collection Type->Urine,  Clean Catch          Imaging Results          X-Ray Toe 2 or More Views Right (Final result)  Result time 10/25/18 01:33:03    Final result by Gregorio Huntley MD (10/25/18 01:33:03)                 Impression:      No acute osseous abnormality identified.      Electronically signed by: Gregorio Huntley MD  Date:    10/25/2018  Time:    01:33             Narrative:    EXAMINATION:  XR TOE 2 OR MORE VIEWS RIGHT    CLINICAL HISTORY:  infection;    COMPARISON:  None    FINDINGS:  No evidence of acute fracture, dislocation, or osseous destructive process.  No osseous erosive changes or convincing evidence to suggest osteomyelitis.  Mild soft tissue air is seen at the distal medial aspect of the great toe.  No radiopaque retained foreign body seen.                              X-Rays:   Independently Interpreted Readings:   Other Readings:  R great toe XR no bony fx, some superficial soft tissue air    Medical Decision Making:   History:   Old Medical Records: I decided to obtain old medical records.  Old Records Summarized: records from previous admission(s) and records from another hospital.  Initial Assessment:   53 y.o. Male with DM and hep C presents with R toe pain and redness.  Differential Diagnosis:   Ddx includes paronychia, cellulitis, abscess, necrotizing fasciitis, sepsis, other.  Independently Interpreted Test(s):   I have ordered and independently interpreted X-rays - see prior notes.  Clinical Tests:   Lab Tests: Ordered and Reviewed  Radiological Study: Ordered and Reviewed  ED Management:  Exam c/w paronychia, which I drained as noted (digital block with lido) after giving the patient lorazepam for anxiolysis and morphine/toradol for pain control with zofran to prevent nausea. The site was covered with bacitracin after the procedure.    XR shows minimal soft tissue air at the site of the paronychia. There is no swelling or redness beyond the toenail edges. The toe itself is nontender. There is no tissue  crepitus.    Labs show WBC 13. Lactate 3 but this specimen was evidently drawn with the tourniquet still up. Labs show hyperglycemia but no anion gap or ketones to suggest DKA. Last A1C here was 11.2 around 10 months ago, so I suspect patient is poorly-controlled at baseline.    I treated hyperglycemia with NS bolus (1L), IV insulin (6 units).     I treated the infection with clindamycin PO and ciprofloxacin PO. His perianal abscess around a year ago grew out E coli, which would be an uncommon pathogen for skin infection, but should be covered by cipro. Cipro should also cover staph and clinda should cover strep and staph.     I discussed with the patient the importance of strict glycemic control, as hyperglycemia will impede wound helaing and may make him more susceptible to bacterial infection as well. I have also discussed return precautions with patient and his son. If redness spreads from toe, if foot is more swollen, if there is more discharge from the wound, or the patient develops systemic symptoms like fevers/chills, he is to return to the emergency department immediately.    D/c'ed home with son. Patient will take the week off from work as he spends 12 hours in boots [place] and cannot insure toe cleanliness at this job. I have rx'ed toradol/percocet PRN pain and clinda/cipro for 10 days to cover infection.    F/u PMD and podiatry.            Scribe Attestation:   Scribe #1: I performed the above scribed service and the documentation accurately describes the services I performed. I attest to the accuracy of the note.    Attending Attestation:           Physician Attestation for Scribe:  Physician Attestation Statement for Scribe #1: I, Taylor Rendon MD, reviewed documentation, as scribed by Komal Raines in my presence, and it is both accurate and complete.                    Clinical Impression:   The primary encounter diagnosis was Paronychia of great toe, right.  Diagnoses of Hyperglycemia and Pain of great toe, unspecified laterality were also pertinent to this visit.                             Taylor Rendon MD  10/30/18 2021

## 2018-10-25 NOTE — ED TRIAGE NOTES
Pt arrives to er via personal vehicle alone aaox4. Pt report right great toe swollen and red since 1500 today. 7/10 throbbing. Denies nausea, vomitting, having diarrhea for 3.5 weeks.

## 2018-10-28 LAB
BACTERIA SPEC AEROBE CULT: NORMAL
BACTERIA SPEC AEROBE CULT: NORMAL

## 2018-10-30 LAB
BACTERIA BLD CULT: NORMAL
BACTERIA BLD CULT: NORMAL
BACTERIA SPEC ANAEROBE CULT: NORMAL

## 2019-05-22 NOTE — PLAN OF CARE
"SW met with patient to complete discharge needs assessment. SW provided and reviewed with patient "Blue Health Packet", "Discharge Planning Begins on Admission" brochure and "Help At Home" handout. EVONNE discussed with patient the things he will be responsible for to manage his health at home. Patient reports he will stay with his sister when he's first discharged from the hospital. Patient prefers doctor appointments on Mondays or early Tuesday mornings because he works overnight. Patient informed SW his insurance will not cover a glucometer and he's in need of having one, co-pay is $143. SW informed patient she will provide him with information for reasonably priced glucometer's with the diabetic supplies he can get OTC at [facility].         [facility] - [address] EX[address] AT [address] [address]  Phone: [phone] Fax: [phone]    [facility] - [place] [address] [facility] [address]  [facility] [address]  Phone: [phone] Fax: [phone]    [facility] [address] [address]  Phone: [phone] Fax: [phone]    [facility] - [facility] [address] [facility]  [address] [facility] [address]  Phone: [phone] Fax: [phone]    [facility] [address] [facility] [address] [facility]  [address] [facility] [address]  Phone: [phone] Fax: [phone]           09/14/17 1418   Discharge Assessment   Assessment Type Discharge Planning Assessment   Confirmed/corrected address and phone number on facesheet? Yes   Assessment information obtained from? Patient   Prior to hospitilization cognitive status: Alert/Oriented;No Deficits   Prior to hospitalization functional status: Independent   Current cognitive status: Alert/Oriented;No Deficits   Current Functional " Status: Needs Assistance   Facility Arrived From: Home   Lives With alone   Able to Return to Prior Arrangements unable to determine at this time (comments)   Is patient able to care for self after discharge? Unable to determine at this time (comments)   Who are your caregiver(s) and their phone number(s)? Freddy (son) [phone]   Patient's perception of discharge disposition home or selfcare   Readmission Within The Last 30 Days no previous admission in last 30 days   Patient currently being followed by outpatient case management? No   Patient currently receives any other outside agency services? No   Equipment Currently Used at Home none   Do you have any problems affording any of your prescribed medications? No   Is the patient taking medications as prescribed? yes   Does the patient have transportation home? Yes   Transportation Available car;family or friend will provide   Does the patient receive services at the Coumadin Clinic? No   Discharge Plan A Home  (PCP F/U)   Discharge Plan B Home   Patient/Family In Agreement With Plan yes      0

## 2019-10-01 ENCOUNTER — TELEPHONE (OUTPATIENT)
Dept: HEPATOLOGY | Facility: CLINIC | Age: 54
End: 2019-10-01

## 2019-10-01 NOTE — TELEPHONE ENCOUNTER
----- Message from Evie White sent at 9/23/2019  4:06 PM CDT -----  Contact: Pt      ----- Message -----  From: aNty Galvan MA  Sent: 9/23/2019   3:52 PM CDT  To: Hepatology Scheduling    Patient is trying to make an appointment, he hasn't been here since 2017.      Pt# [phone]

## 2019-10-02 ENCOUNTER — TELEPHONE (OUTPATIENT)
Dept: HEPATOLOGY | Facility: CLINIC | Age: 54
End: 2019-10-02

## 2019-10-02 NOTE — TELEPHONE ENCOUNTER
----- Message from Boone Truong PA-C sent at 10/2/2019  8:16 AM CDT -----  I see you scheduled him for follow up. He has a h/o cirrhosis, can you schedule HCC screening as well. Orders are already in

## 2019-10-21 ENCOUNTER — OFFICE VISIT (OUTPATIENT)
Dept: HEPATOLOGY | Facility: CLINIC | Age: 54
End: 2019-10-21
Payer: MEDICAID

## 2019-10-21 ENCOUNTER — HOSPITAL ENCOUNTER (OUTPATIENT)
Dept: RADIOLOGY | Facility: HOSPITAL | Age: 54
Discharge: HOME | End: 2019-10-21
Attending: PHYSICIAN ASSISTANT
Payer: MEDICAID

## 2019-10-21 VITALS
OXYGEN SATURATION: 95 % | TEMPERATURE: 98 F | WEIGHT: 194 LBS | HEIGHT: 71 IN | RESPIRATION RATE: 18 BRPM | BODY MASS INDEX: 27.16 KG/M2 | HEART RATE: 78 BPM | SYSTOLIC BLOOD PRESSURE: 162 MMHG | DIASTOLIC BLOOD PRESSURE: 98 MMHG

## 2019-10-21 DIAGNOSIS — E11.65 UNCONTROLLED TYPE 2 DIABETES MELLITUS WITH HYPERGLYCEMIA: Chronic | ICD-10-CM

## 2019-10-21 DIAGNOSIS — K74.60 CIRRHOSIS OF LIVER WITHOUT ASCITES, UNSPECIFIED HEPATIC CIRRHOSIS TYPE: ICD-10-CM

## 2019-10-21 DIAGNOSIS — B18.2 CHRONIC HEPATITIS C WITHOUT HEPATIC COMA: ICD-10-CM

## 2019-10-21 DIAGNOSIS — K74.69 OTHER CIRRHOSIS OF LIVER: Primary | ICD-10-CM

## 2019-10-21 PROCEDURE — 99215 OFFICE O/P EST HI 40 MIN: CPT | Mod: PBBFAC,25 | Performed by: PHYSICIAN ASSISTANT

## 2019-10-21 PROCEDURE — 99999 PR PBB SHADOW E&M-EST. PATIENT-LVL V: ICD-10-PCS | Mod: PBBFAC,,, | Performed by: PHYSICIAN ASSISTANT

## 2019-10-21 PROCEDURE — 99999 PR PBB SHADOW E&M-EST. PATIENT-LVL V: CPT | Mod: PBBFAC,,, | Performed by: PHYSICIAN ASSISTANT

## 2019-10-21 PROCEDURE — 99214 PR OFFICE/OUTPT VISIT, EST, LEVL IV, 30-39 MIN: ICD-10-PCS | Mod: S$PBB,,, | Performed by: PHYSICIAN ASSISTANT

## 2019-10-21 PROCEDURE — 99214 OFFICE O/P EST MOD 30 MIN: CPT | Mod: S$PBB,,, | Performed by: PHYSICIAN ASSISTANT

## 2019-10-21 PROCEDURE — 76705 ECHO EXAM OF ABDOMEN: CPT | Mod: 26,,, | Performed by: INTERNAL MEDICINE

## 2019-10-21 PROCEDURE — 76705 ECHO EXAM OF ABDOMEN: CPT | Mod: TC

## 2019-10-21 PROCEDURE — 76705 US ABDOMEN LIMITED: ICD-10-PCS | Mod: 26,,, | Performed by: INTERNAL MEDICINE

## 2019-10-21 RX ORDER — INSULIN LISPRO 100 [IU]/ML
INJECTION, SUSPENSION SUBCUTANEOUS
COMMUNITY
Start: 2019-10-15 | End: 2020-04-17

## 2019-10-21 RX ORDER — CALCIUM CITRATE/VITAMIN D3 200MG-6.25
TABLET ORAL
COMMUNITY
Start: 2019-09-23

## 2019-10-21 RX ORDER — PEN NEEDLE, DIABETIC 31 GX5/16"
NEEDLE, DISPOSABLE MISCELLANEOUS
COMMUNITY
Start: 2019-09-23

## 2019-10-21 RX ORDER — VELPATASVIR AND SOFOSBUVIR 100; 400 MG/1; MG/1
1 TABLET, FILM COATED ORAL DAILY
Qty: 28 TABLET | Refills: 2 | Status: SHIPPED | OUTPATIENT
Start: 2019-10-21 | End: 2019-12-30

## 2019-10-21 RX ORDER — ESCITALOPRAM OXALATE 5 MG/1
5 TABLET ORAL DAILY
COMMUNITY
Start: 2019-09-30

## 2019-10-21 NOTE — Clinical Note
I did not discuss EGD with him in clinic, he's not had one, would he be interested? Transportation was an issue

## 2019-10-22 ENCOUNTER — TELEPHONE (OUTPATIENT)
Dept: PHARMACY | Facility: CLINIC | Age: 54
End: 2019-10-22

## 2019-10-22 ENCOUNTER — TELEPHONE (OUTPATIENT)
Dept: HEPATOLOGY | Facility: CLINIC | Age: 54
End: 2019-10-22

## 2019-10-22 ENCOUNTER — EPISODE CHANGES (OUTPATIENT)
Dept: HEPATOLOGY | Facility: CLINIC | Age: 54
End: 2019-10-22

## 2019-10-22 NOTE — TELEPHONE ENCOUNTER
LVM for callback to inform patient that [facility] received prescription for EPCLUSA and prior authorization is required.  [facility] will be back in touch once insurance determination is received.

## 2019-10-22 NOTE — TELEPHONE ENCOUNTER
Attempt made to reach patient X 2.  Msg from JESENIA Truong left on his VM and mailed to him.  I stressed that he call us for questions and scheduling.

## 2019-10-22 NOTE — TELEPHONE ENCOUNTER
----- Message from Boone Truong PA-C sent at 10/21/2019  4:46 PM CDT -----  I did not discuss EGD with him in clinic, he's not had one, would he be interested? Transportation was an issue

## 2019-10-22 NOTE — TELEPHONE ENCOUNTER
Attempt made to reach patient to see if he wanted to have EGD done per JESENIA Truong.  LVM asking that he call us back.

## 2019-10-28 ENCOUNTER — TELEPHONE (OUTPATIENT)
Dept: HEPATOLOGY | Facility: CLINIC | Age: 54
End: 2019-10-28

## 2019-10-28 DIAGNOSIS — K74.69 OTHER CIRRHOSIS OF LIVER: Primary | ICD-10-CM

## 2019-10-29 NOTE — TELEPHONE ENCOUNTER
Attempt made to reach patient.  LVM informing him that EGD order was placed and that he needed to contact endo  at [phone] for scheduling.

## 2019-11-08 ENCOUNTER — TELEPHONE (OUTPATIENT)
Dept: PHARMACY | Facility: CLINIC | Age: 54
End: 2019-11-08

## 2019-11-14 ENCOUNTER — TELEPHONE (OUTPATIENT)
Dept: ENDOCRINOLOGY | Facility: CLINIC | Age: 54
End: 2019-11-14

## 2019-11-14 NOTE — TELEPHONE ENCOUNTER
----- Message from April Gibbs sent at 11/14/2019  2:05 PM CST -----  Contact: Self  Pt needing a call back to reschedule 12/24 appointment @ [phone]

## 2019-11-21 NOTE — TELEPHONE ENCOUNTER
Initial Epclusa Consultation attempted. NA on patient's/patient's daughter's phone number, not able to LVM. Will f/u if no call back.   - Will notify provider and send post card to address on file on next failed call attempt.

## 2019-12-03 NOTE — TELEPHONE ENCOUNTER
Initial Epclusa Consultation attempted. NA on patient's/patient's daughter's phone number, not able to LVM. Will f/u if no call back.   - [facility] will notify provider and send post card to address on file.

## 2019-12-04 ENCOUNTER — EPISODE CHANGES (OUTPATIENT)
Dept: HEPATOLOGY | Facility: CLINIC | Age: 54
End: 2019-12-04

## 2019-12-04 ENCOUNTER — TELEPHONE (OUTPATIENT)
Dept: HEPATOLOGY | Facility: CLINIC | Age: 54
End: 2019-12-04

## 2019-12-04 NOTE — TELEPHONE ENCOUNTER
Please attempt to schedule f/u since pharmacy cannot reach pt to ship meds, if unable to reach, please mail letter

## 2019-12-10 NOTE — TELEPHONE ENCOUNTER
Initial Epclusa Consultation attempted. NA, LVM for call back. Will f/u if no call back.   - [facility] notified provider and sent post card to address on file on 12/3/19.

## 2019-12-19 NOTE — TELEPHONE ENCOUNTER
Initial Epclusa Consultation attempted (attempt 6). NA, LVM for call back. Will f/u if no call back.   - [facility] notified provider and sent post card to address on file on 12/3/19.  -  Office sent letter on 12/4/19.

## 2019-12-30 ENCOUNTER — TELEPHONE (OUTPATIENT)
Dept: HEPATOLOGY | Facility: CLINIC | Age: 54
End: 2019-12-30

## 2019-12-30 ENCOUNTER — EPISODE CHANGES (OUTPATIENT)
Dept: HEPATOLOGY | Facility: CLINIC | Age: 54
End: 2019-12-30

## 2019-12-30 NOTE — TELEPHONE ENCOUNTER
----- Message from Rosa Mckeon PharmD sent at 12/19/2019 12:03 PM CST -----  Regarding: Multiple Failed Patient Contact attempts   EFREN Strauss continues to have failed attempts at reaching Mr. Marshall regarding Epclusa Rx. We have exhausted all options of patient contact, including sending a post card to address on file, with no success. With your permission, we would like to close the patient out of our system at this time and re-open if patient contact is made in the future.     Patient has never received medication due to lack of contact to set up first shipment.     Let us know if there is anything else we can assist with.     Rosa Mckeon, Pharm D  [facility]   [phone]

## 2020-01-10 ENCOUNTER — TELEPHONE (OUTPATIENT)
Dept: HEPATOLOGY | Facility: CLINIC | Age: 55
End: 2020-01-10

## 2020-01-10 NOTE — TELEPHONE ENCOUNTER
----- Message from Rosa Mckeon PharmD sent at 1/9/2020 12:27 PM CST -----  Regarding: New Rx for Epclusa  Hey Boone,      Mr. Nicholas Marshall reached out to [facility] today stating he would like to get started on Epclusa treatment. I explained we would have to receive a new Rx since we closed his profile due to lack of contact. New phone numbers have been obtained and entered in Epic.    I wanted to see how you would like to proceed. Would you like to see him in the clinic and get new labs before sending Rx?     Let us know how we can help.     Thanks,   Rosa Mckeon, Pharm D  [facility]   [phone]

## 2020-01-10 NOTE — TELEPHONE ENCOUNTER
Given the MANY attempts we made to reach him to start HCV treatment, I would recommend f/u first     Thanks

## 2020-01-30 ENCOUNTER — TELEPHONE (OUTPATIENT)
Dept: GASTROENTEROLOGY | Facility: CLINIC | Age: 55
End: 2020-01-30

## 2020-01-30 NOTE — TELEPHONE ENCOUNTER
Returned call to pt re: he needed a new rx for Epclusa.  dvised it was no prescribed by GI.  Forwarded note to HCV staff.

## 2020-01-30 NOTE — TELEPHONE ENCOUNTER
[facility] contacted patient per daughter's phone # ([phone]) and advised he needs to call the HCV clinic to schedule f/u appt. Patient verbalized understanding and was given the clinic's phone # to set up appt. Patient questioned about [facility] shipping the medication once we received the Rx from the office. Patient advised [facility] must speak with the patient to complete initial consultation and confirm the shipping address before sending anything to patient's home. Patient verbalized understanding.

## 2020-01-30 NOTE — TELEPHONE ENCOUNTER
Attempted to contact pt by phone. No answer. VM left asking that pt contact us for scheduling. Awaiting call back.

## 2020-01-31 ENCOUNTER — EPISODE CHANGES (OUTPATIENT)
Dept: HEPATOLOGY | Facility: CLINIC | Age: 55
End: 2020-01-31

## 2020-02-07 ENCOUNTER — TELEPHONE (OUTPATIENT)
Dept: HEPATOLOGY | Facility: CLINIC | Age: 55
End: 2020-02-07

## 2020-02-07 NOTE — PROGRESS NOTES
"Subjective:      Patient ID: Nicholas Marshall is a 55 y.o. male.    Chief Complaint:  Follow-up    History of Present Illness  Nicholas Marshall presents today for follow up of Type 2 DM. Previous patient of MARIAA Olguin NP. Last seen in Oct 2017. This is his first visit with me. H/o infrequent follow-up He is accompanied by his sister.     He reports since his last visit, he had recurrence of Hepatitis C. He will start a new medication and needs endoscope prior.     With regards to the diabetes:    Diagnosed with Type 2 DM in 2013   Began treatment of orals. Began insulin therapy in 2014.   Reports dietary changes after dx allowed him 100#.  Follows with hepatology for + HCV (1980s).   He is now on disability.     Family History of Type 2 DM none. Sister is "borderline"  Known complications:   DKA/HHS: none  RN: needs eye exam  PN: +   Nephropathy: unknown, needs urine  Gastroparesis: denies  CAD: denies    Current regimen:  Lantus 40 U in AM  Humalog 40U AC + SSI (PCP changed to 20 units BID yesterday but he did not start)    He reports missing both medications 3-4 times weekly  He states he can only use insulin pens.    Other medications tried:   Metformin -states did not work    4 # times a day testing   BG in clinic today is 314, fasting.   Log reviewed: none Oral recall 300-400's  Hypoglycemic event? Denies  Yes, did not need assistance   Knows how to correct with 15 grams of carbs- juice, coke, or a peppermint.     Dietary recall: He reports he feels that he is not following diabetic diet.   Eats 3 meals a day.   B: oatmeal with sugar and banana or toast; likes eggs  L: sandwich with chips   D: meat and vegetables with potato or other carbs  Snacks: cookies, chips, donuts, candy  Drinks: coke-4 2L daily    Exercise - tried to stay active. Was doing cycling in the past but no longer doing any formal exercise.      Education - last visit: a while ago    Has a Medic alert " "tag-wearing  Glucagon/Baqsimi- lives alone    Diabetes Management Status  Statin: Not taking  ACE/ARB: Not taking    Lab Results   Component Value Date    HGBA1C 11.2 (H) 12/11/2017    HGBA1C 11.5 (H) 09/19/2017    HGBA1C 12.3 (H) 06/14/2017     Screening or Prevention Patient's value Goal Complete/Controlled?   HgA1C Testing and Control   Lab Results   Component Value Date    HGBA1C 11.2 (H) 12/11/2017      Annually/Less than 8% No   Lipid profile : 09/19/2017 Annually No   LDL control Lab Results   Component Value Date    LDLCALC 73.4 09/19/2017    Annually/Less than 100 mg/dl  No   Nephropathy screening Lab Results   Component Value Date    LABMICR <2.5 06/19/2017     Lab Results   Component Value Date    PROTEINUA Negative 10/24/2018    Annually No   Blood pressure BP Readings from Last 1 Encounters:   02/11/20 (!) 130/90    Less than 140/90 No   Dilated retinal exam : 10/23/2017 Annually No   Foot exam   : 02/11/2020 Annually No     Review of Systems   Constitutional: Negative for fatigue.   Eyes: Negative for visual disturbance.   Respiratory: Negative for shortness of breath.    Cardiovascular: Negative for chest pain.   Gastrointestinal: Negative for abdominal pain.   Endocrine: Positive for polydipsia, polyphagia and polyuria.   Musculoskeletal: Negative for arthralgias.   Skin: Negative for wound.   Neurological: Negative for headaches.   Hematological: Does not bruise/bleed easily.   Psychiatric/Behavioral: Negative for sleep disturbance.       Objective:   Physical Exam   Constitutional: He appears well-developed.   Neck: No thyromegaly present.   Cardiovascular: Normal rate.   Pulmonary/Chest: Effort normal.   Abdominal: Soft.   Vitals reviewed.  injection sites are without edema or erythema. No lipo hypertropthy or atrophy  Diabetes Foot Exam:   No sores or lesions to bilateral feet  Shoes appropriate  sensation intact to vibration and monofilament    Visit Vitals  BP (!) 130/90   Ht 5' 11" (1.803 m) "   Wt 87 kg (191 lb 12.8 oz)   BMI 26.75 kg/m²        Lab Review:   Lab Results   Component Value Date    HGBA1C 11.2 (H) 12/11/2017    HGBA1C 11.5 (H) 09/19/2017    HGBA1C 12.3 (H) 06/14/2017      Lab Results   Component Value Date    CHOL 123 09/19/2017    HDL 28 (L) 09/19/2017    LDLCALC 73.4 09/19/2017    TRIG 108 09/19/2017    CHOLHDL 22.8 09/19/2017     Lab Results   Component Value Date     10/21/2019    K 4.4 10/21/2019     10/21/2019    CO2 27 10/21/2019     (H) 10/21/2019    BUN 9 10/21/2019    CREATININE 0.7 10/21/2019    CALCIUM 9.2 10/21/2019    PROT 6.9 10/21/2019    ALBUMIN 3.7 10/21/2019    BILITOT 0.8 10/21/2019    ALKPHOS 122 10/21/2019    AST 81 (H) 10/21/2019     (H) 10/21/2019    ANIONGAP 8 10/21/2019    ESTGFRAFRICA >60.0 10/21/2019    EGFRNONAA >60.0 10/21/2019    TSH 1.146 12/11/2017     Vit D, 25-Hydroxy   Date Value Ref Range Status   12/11/2017 17 (L) 30 - 96 ng/mL Final     Comment:     Vitamin D deficiency.........<10 ng/mL                              Vitamin D insufficiency......10-29 ng/mL       Vitamin D sufficiency........> or equal to 30 ng/mL  Vitamin D toxicity............>100 ng/mL       Assessment and Plan     1. Uncontrolled type 2 diabetes mellitus with hyperglycemia  Ambulatory referral/consult to Ophthalmology    glucagon, human recombinant, (GLUCAGON EMERGENCY KIT, HUMAN,) 1 mg SolR    Ambulatory referral/consult to Diabetes Education    Comprehensive metabolic panel    Hemoglobin A1c    Lipid panel    Microalbumin/creatinine urine ratio    TSH    C-peptide    Glutamic acid decarboxylase   2. Essential hypertension     3. Hyperlipidemia associated with type 2 diabetes mellitus  Lipid panel   4. Vitamin D deficiency disease  Vitamin D       Type 2 diabetes mellitus, uncontrolled  -- Reviewed goals of therapy are to get the best control we can without hypoglycemia  -- Refer to diabetes education  -- Discussed we need more information via labs and  logs to determine treatment plan. He agrees to obtain labs and RTC in 2 weeks with logs.  -- Refer to ophthalmology   Medication changes:   Continue Lantus 40 U in AM  Continue Humalog 40U AC + SSI isf 25/150  -- Considering his Bgs are persistently high from self-report, I suspect he is missing doses of insulin  -- Discussed Dexcom and he is interested. Discussed I need BG logs of 4 time daily, AC and HS BG checks prior to obtaining.   -- Reviewed patient's current insulin regimen. Clarified proper insulin dose and timing in relation to meals, etc. Insulin injection sites and proper rotation instructed.    -- Advised frequent self blood glucose monitoring.  Patient encouraged to document glucose results and bring them to every clinic visit    -- Hypoglycemia precautions discussed. Instructed on precautions before driving.    -- Call for Bg repeatedly < 90 or > 180. Call for any BG <70.   -- Close adherence to lifestyle changes recommended.   -- Periodic follow ups for eye evaluations, foot care and dental care suggested.  -- Encouraged exercise per ADA guidelines of 150 minutes weekly. Encouraged DM diet and low carb snacks and will give written information. Encouraged to  proteins with carbs, encouraged ¼ cup of carbs per meal and 30-45 grams of carbs per meal, eat 3 meals daily and glucerna or protein bar if skipping a meal.  -- Avoid coke and cookies.  -- Given list of low carb snacks and Dm drinks  -- He is interested in insulin pump-discussed we will consider after we have more recent information       Essential hypertension  -- not on ACEI ARB-check [facility]  -- Controlled  -- Blood pressure goals discussed with patient      Hyperlipidemia associated with type 2 diabetes mellitus  -- Check lipid   -- Not on statin per ADA recommendations-will defer to hepatology       Vitamin D deficiency disease  -- Check Vit D      Follow up in about 2 weeks (around 2/25/2020).  Labs today

## 2020-02-07 NOTE — TELEPHONE ENCOUNTER
----- Message from Evie Fraga RN sent at 1/31/2020  8:15 AM CST -----  JESENIA Truong prescribed Epclusa.  [facility] got auth but could never reach patient to setup Rx so episode closed.  Patient phoned ready to start tx.  Now [facility] needs new Rx for drug so that can auth/ship.

## 2020-02-11 ENCOUNTER — OFFICE VISIT (OUTPATIENT)
Dept: ENDOCRINOLOGY | Facility: CLINIC | Age: 55
End: 2020-02-11
Payer: MEDICAID

## 2020-02-11 ENCOUNTER — TELEPHONE (OUTPATIENT)
Dept: HEPATOLOGY | Facility: CLINIC | Age: 55
End: 2020-02-11

## 2020-02-11 VITALS
HEIGHT: 71 IN | WEIGHT: 191.81 LBS | SYSTOLIC BLOOD PRESSURE: 130 MMHG | BODY MASS INDEX: 26.85 KG/M2 | DIASTOLIC BLOOD PRESSURE: 90 MMHG

## 2020-02-11 DIAGNOSIS — B18.2 CHRONIC HEPATITIS C WITHOUT HEPATIC COMA: Primary | ICD-10-CM

## 2020-02-11 DIAGNOSIS — I10 ESSENTIAL HYPERTENSION: Chronic | ICD-10-CM

## 2020-02-11 DIAGNOSIS — E11.69 HYPERLIPIDEMIA ASSOCIATED WITH TYPE 2 DIABETES MELLITUS: ICD-10-CM

## 2020-02-11 DIAGNOSIS — E55.9 VITAMIN D DEFICIENCY DISEASE: ICD-10-CM

## 2020-02-11 DIAGNOSIS — E78.5 HYPERLIPIDEMIA ASSOCIATED WITH TYPE 2 DIABETES MELLITUS: ICD-10-CM

## 2020-02-11 DIAGNOSIS — E11.65 UNCONTROLLED TYPE 2 DIABETES MELLITUS WITH HYPERGLYCEMIA: Primary | Chronic | ICD-10-CM

## 2020-02-11 PROCEDURE — 99213 OFFICE O/P EST LOW 20 MIN: CPT | Mod: PBBFAC | Performed by: NURSE PRACTITIONER

## 2020-02-11 PROCEDURE — 99999 PR PBB SHADOW E&M-EST. PATIENT-LVL III: CPT | Mod: PBBFAC,,, | Performed by: NURSE PRACTITIONER

## 2020-02-11 PROCEDURE — 99214 PR OFFICE/OUTPT VISIT, EST, LEVL IV, 30-39 MIN: ICD-10-PCS | Mod: S$PBB,,, | Performed by: NURSE PRACTITIONER

## 2020-02-11 PROCEDURE — 99999 PR PBB SHADOW E&M-EST. PATIENT-LVL III: ICD-10-PCS | Mod: PBBFAC,,, | Performed by: NURSE PRACTITIONER

## 2020-02-11 PROCEDURE — 99214 OFFICE O/P EST MOD 30 MIN: CPT | Mod: S$PBB,,, | Performed by: NURSE PRACTITIONER

## 2020-02-11 RX ORDER — VELPATASVIR AND SOFOSBUVIR 100; 400 MG/1; MG/1
1 TABLET, FILM COATED ORAL DAILY
Qty: 28 TABLET | Refills: 2 | Status: SHIPPED | OUTPATIENT
Start: 2020-02-11

## 2020-02-11 NOTE — ASSESSMENT & PLAN NOTE
-- Reviewed goals of therapy are to get the best control we can without hypoglycemia  -- Refer to diabetes education  -- Discussed we need more information via labs and logs to determine treatment plan. He agrees to obtain labs and RTC in 2 weeks with logs.  -- Refer to ophthalmology   Medication changes:   Continue Lantus 40 U in AM  Continue Humalog 40U AC + SSI isf 25/150  -- Considering his Bgs are persistently high from self-report, I suspect he is missing doses of insulin  -- Discussed Dexcom and he is interested. Discussed I need BG logs of 4 time daily, AC and HS BG checks prior to obtaining.   -- Reviewed patient's current insulin regimen. Clarified proper insulin dose and timing in relation to meals, etc. Insulin injection sites and proper rotation instructed.    -- Advised frequent self blood glucose monitoring.  Patient encouraged to document glucose results and bring them to every clinic visit    -- Hypoglycemia precautions discussed. Instructed on precautions before driving.    -- Call for Bg repeatedly < 90 or > 180. Call for any BG <70.   -- Close adherence to lifestyle changes recommended.   -- Periodic follow ups for eye evaluations, foot care and dental care suggested.  -- Encouraged exercise per ADA guidelines of 150 minutes weekly. Encouraged DM diet and low carb snacks and will give written information. Encouraged to  proteins with carbs, encouraged ¼ cup of carbs per meal and 30-45 grams of carbs per meal, eat 3 meals daily and glucerna or protein bar if skipping a meal.  -- Avoid coke and cookies.  -- Given list of low carb snacks and Dm drinks  -- He is interested in insulin pump-discussed we will consider after we have more recent information

## 2020-02-11 NOTE — PATIENT INSTRUCTIONS
CALL FOR BG less than 70      Snacks can be an important part of a balanced, healthy meal plan. They allow you to eat more frequently, feeling full and satisfied throughout the day. Also, they allow you to spread carbohydrates evenly, which may stabilize blood sugars.  Plus, snacks are enjoyable!     The amount of carbohydrate needed at snacks varies. Generally, about 15-30 grams of carbohydrate per snack is recommended.  Below you will find some tasty treats.       0-5 gm carb   Crystal Light   Vitamin Water Zero   Herbal tea, unsweetened   2 tsp peanut butter on celery   1./2 cup sugar-free jell-o   1 sugar-free popsicle   ¼ cup blueberries   8oz Blue Shala unsweetened almond milk   5 baby carrots & celery sticks, cucumbers, bell peppers dipped in ¼ cup salsa, 2Tbsp light ranch dressing or 2Tbsp plain Greek yogurt   10 Goldfish crackers   ½ oz low-fat cheese or string cheese   1 closed handful of nuts, unsalted   1 Tbsp of sunflower seeds, unsalted   1 cup Smart Pop popcorn   1 whole grain brown rice cake        15 gm carb   1 small piece of fruit or ½ banana or 1/2 cup lite canned fruit   3 shelly cracker squares   3 cups Smart Pop popcorn, top spray butter, Jimenez lite salt or cinnamon and Truvia   5 Vanilla Wafers   ½ cup low fat, no added sugar ice cream or frozen yogurt (Blue bell, Blue Bunny, Weight Watchers, Skinny Cow)   ½ turkey, ham, or chicken sandwich   ½ c fruit with ½ c Cottage cheese   4-6 unsalted wheat crackers with 1 oz low fat cheese or 1 tbsp peanut butter    30-45 goldfish crackers (depending on flavor)    7-8 Christianity mini brown rice cakes (caramel, apple cinnamon, chocolate)    12 Christianity mini brown rice cakes (cheddar, bbq, ranch)    1/3 cup hummus dip with raw veg   1/2 whole wheat richard, 1Tbsp hummus   Mini Pizza (1/2 whole wheat English muffin, low-fat  cheese, tomato sauce)   100 calorie snack pack (Oreo, Chips Ahoy, Ritz Mix, Baked Cheetos)   4-6 oz. light  or Greek Style yogurt (Chobani, Yoplait, Okios, Stoneyfield)   ½ cup sugar-free pudding     6 in. wheat tortilla or richard oven toasted chips (topped with spray butter flavoring, cinnamon, Truvia OR spray butter, garlic powder, chili powder)    18 BBQ Popchips (available at Target, Whole Foods, Fresh Market)     Diabetic drinks we recommend:   Water -BEST  Crystal light sugar free packets  Gatorade zero   Powerade zero  Tea without sweetener    Diabetic drinks we do not recommend:  Coke or any sugary regular soft drink  Coffee with sugar  Milk  Juice  Beer  Liquor    Sweeteners we recommend:  Stevia   Truvia   Santo    Note: Caffeine can increase your blood sugar     Hypoglycemia - Low Blood Sugar    What can you do?  Rule of 15:    Test your blood sugar   If glucose is between 50-70 mg/dL then ingest 15 grams of fast-acting carbs   If glucose is less than 50 mg/dL then ingest 30 grams of fast-acting carbs   Ingest 15 grams of fast-acting carbohydrate - such as:   a. 3-4 glucose tablets  b. 4 oz juice  c. ½ can regular soda pop  d. 15 skittles or mini jelly beans    Re-check your blood sugar in 15 minutes. If its less than 70mg/dl, repeat steps 2 and 3.   If your next meal is more than 1 hour away, eat an additional 15 grams of carbohydrate and 1 ounce of protein (examples include crackers with cheese or one-half of a sandwich with peanut butter). It is important not to eat too much because this can raise your blood sugar above the target level.      After your blood sugar has normalized, think about why you went low. If you notice a pattern of low blood sugars, contact your Diabetes Team. We may need to adjust your medication.

## 2020-02-11 NOTE — TELEPHONE ENCOUNTER
----- Message from Evie Fraga RN sent at 2/11/2020  3:56 PM CST -----  Patient stopped by clinic today. He now wants to start hep c tx.  Patient told to contact [facility] to setup shipment.  He contacted [facility] but was told a new Rx would have to be sent to them. Please advise.

## 2020-02-11 NOTE — TELEPHONE ENCOUNTER
Last seen by Boone BA 10/21/19  G2  Sof + RBV - Relapse  Well comp cirrhosis    Epcl x 12 prescribed but pt lost to f/u and never started  Now wants to be treated  Per [facility], new rx needed  _____________________________________________  Celine - new rx for epclusa sent but I've not talked to him to make sure he's not on acid suppressants    Lupe / Lili - PLS SCHEDULE for CBC, CMP, INR, AFP, U/S, VISIT w/ me 4/2020

## 2020-02-12 ENCOUNTER — PATIENT MESSAGE (OUTPATIENT)
Dept: ENDOCRINOLOGY | Facility: CLINIC | Age: 55
End: 2020-02-12

## 2020-02-12 DIAGNOSIS — E55.9 VITAMIN D DEFICIENCY DISEASE: Primary | ICD-10-CM

## 2020-02-12 RX ORDER — ERGOCALCIFEROL 1.25 MG/1
50000 CAPSULE ORAL
Qty: 12 CAPSULE | Refills: 0 | Status: SHIPPED | OUTPATIENT
Start: 2020-02-12 | End: 2020-05-12

## 2020-02-12 NOTE — TELEPHONE ENCOUNTER
I spoke with patient and msg from PA Scheuermann relayed.  F/u with testing scheduled 4/23/20; appt notice mailed.  Patient denied taking any medications to help with stomach discomfort.

## 2020-02-18 ENCOUNTER — TELEPHONE (OUTPATIENT)
Dept: PHARMACY | Facility: CLINIC | Age: 55
End: 2020-02-18

## 2020-02-20 NOTE — TELEPHONE ENCOUNTER
lvm to schedule epclusa initial consult and fill    Bernardino Limon, Pharm.D.  Pharmacy Resident, PGY-1   [facility]

## 2020-02-24 ENCOUNTER — TELEPHONE (OUTPATIENT)
Dept: ENDOCRINOLOGY | Facility: CLINIC | Age: 55
End: 2020-02-24

## 2020-02-26 ENCOUNTER — TELEPHONE (OUTPATIENT)
Dept: ENDOCRINOLOGY | Facility: CLINIC | Age: 55
End: 2020-02-26

## 2020-02-28 ENCOUNTER — TELEPHONE (OUTPATIENT)
Dept: ENDOCRINOLOGY | Facility: CLINIC | Age: 55
End: 2020-02-28

## 2020-03-02 ENCOUNTER — TELEPHONE (OUTPATIENT)
Dept: PHARMACY | Facility: CLINIC | Age: 55
End: 2020-03-02

## 2020-03-02 NOTE — TELEPHONE ENCOUNTER
Initial Epclusa Consultation attempted (attempt 2). NA on patient's phone #, LVM for call back. Spoke with patient's daughter, Jimmy. She agreed to have patient call [facility] for initial consultation and shipment. Will f/u if no call back.

## 2020-03-04 ENCOUNTER — TELEPHONE (OUTPATIENT)
Dept: ENDOCRINOLOGY | Facility: CLINIC | Age: 55
End: 2020-03-04

## 2020-03-04 NOTE — PROGRESS NOTES
Diabetes Education  Author: Vane Pierre RN, CDE  Date: 3/5/2020    Diabetes Care Management Summary  Diabetes Education Record Assessment/Progress: Initial  Current Diabetes Risk Level: High     Last A1c:   Lab Results   Component Value Date    HGBA1C 11.2 (H) 02/11/2020     Last Visit with Diabetes Educator: : 03/05/2020  Last [facility] Referral: : Not Found   Enrolled in [facility]: No    Diabetes History  Diabetes Diagnosis: 3-5 years  Current Treatment: Insulin(Lantus 30 units daily; Humalog 20 units ac plus s/s/ 25/150)  Reviewed Problem List with Patient: Yes    Health Maintenance was reviewed today with patient. Discussed with patient importance of routine eye exams, foot exams/foot care, blood work (i.e.: A1c, microalbumin, and lipid), dental visits, yearly flu vaccine, and pneumonia vaccine as indicated by PCP. Patient verbalized understanding.     Health Maintenance Topics with due status: Not Due       Topic Last Completion Date    Foot Exam 02/11/2020    Lipid Panel 02/11/2020    Hemoglobin A1c 02/11/2020    Urine Microalbumin 02/11/2020     Health Maintenance Due   Topic Date Due    TETANUS VACCINE  01/18/1983    Colonoscopy  01/25/2017    Eye Exam  10/23/2018       Nutrition  Meal Planning: 3 meals per day(sometimes skips lunch)  What type of sweetener do you use?: none  What type of beverages do you drink?: regular soda/tea, water, milk  Meal Plan 24 Hour Recall - Breakfast: oatmeal with sugar and banana or toast; likes eggs, has eliminated added sugar, yogurt breakfast bar  Meal Plan 24 Hour Recall - Lunch: sandwich with chips, salad, burger at fast food restaurant  Meal Plan 24 Hour Recall - Dinner: protein, rice, pasta, grn bean corn  Meal Plan 24 Hour Recall - Snack: nuts, granola bar, oreo cookies, banana    Monitoring   Monitoring: Other  Self Monitoring : SMBG 4 x day  Blood Glucose Logs: Yes  Do you use a personal continuous glucose monitor?: No  In the last month, how often have you had a low  blood sugar reaction?: never  Can you tell when your blood sugar is too high?: yes    Exercise   Exercise Type: none (Was doing cycling in the past but no longer doing any formal exercise due to neuropathy in feet)     Current Diabetes Treatment   Current Treatment: Insulin(Lantus 30 units daily; Humalog 20 units ac plus s/s/ 25/150)  increased to Lantus 30 units bid and Humalog 24 units with meal plus s/s.    Social History  Preferred Learning Method: Face to Face  Primary Support: Self  Occupation: disabled  Smoking Status: Current Smoker(Vapes)     Barriers to Change  Barriers to Change: None  Learning Challenges : None    Readiness to Learn   Readiness to Learn : Acceptance    Cultural Influences  Cultural Influences: None    Diabetes Education Assessment/Progress  The patient is in clinic today for general diabetes education and nutritional counseling.     Diabetes Disease Process (diabetes disease process and treatment options): Discussion, Comprehends Key Points, Written Materials Provided  · general disease progression discussed  · significance of current A1c Chart review indicates has had an A1c at 11% range for at least 5 years  · blood glucose goals  · explained the risk for cardiovascular and cerebellar vascular events with uncontrolled diabetes     Nutrition (Incorporating nutritional management into one's lifestyle): Discussion, Comprehends Key Points, Written Materials Provided  Reviewed patients eating habits.  See above. Has recently stopped drinking regular soda   · Discussed carb sources  · Plate method for carb counting  · Avoidance of sugary drinks, juice, foods, dried fruit  · Appropriate amounts of carbs per meal   Discussed tips for eating out.    Reviewed label reading.     Physical Activity (incorporating physical activity into one's lifestyle): Discussion, Comprehends Key Points, Written Materials Provided  Medications (states correct name, dose, onset, peak, duration, side effects &  timing of meds): Discussion, Comprehends Key Points, Written Materials Provided  Reviewed patient's current medication regimen. MOA reviewed including common side effects. Importance of rotating sites. Advised he must take her insulin as instruction to prevent complications such as DKA.  Discussed timing and mechanism of action of long vs rapid acting insulin. Reviewed need for rotation of injection sites, appropriate insulin storage, safe disposal of used sharps and insulin pen preparation and use. Discussed possible side effects and how to avoid these.    Emphasized need to take Lantus at same time daily and need to take Humalog >4 hours apart. Emphasized need to take Humalog injections at time of eating meals.Reviewed that pt is at high risk of hypoglycemia with current medication regimen. Discussed general vs severe hyperglycemia and risk of DKA. Reviewed proper pen of insulin pens including  keeping pen in skin for a count of 10 before removing.    Log was reviewed with NP. Due to elevated glucose in 200 to 400 range, his regime was increased to Lantus 30 units bid and Humalog 24 units with meal plus s/s.  Instructed to check glucoses with each meal and if he is not eating, to cover the elevated glucose using the sliding scale with Humalog. Instructed patient that the sliding scale is to be used with Humalog only.    Monitoring (monitoring blood glucose/other parameters & using results): Discussion, Comprehends Key Points, Written Materials Provided  Advised frequent self-blood glucose monitoring per HCP instructions.  Patient encouraged to document glucose results and bring them to every clinic visit. Instructed to report glucose in 1 week.     Acute Complications (preventing, detecting, and treating acute complications): Discussion, Comprehends Key Points, Written Materials Provided  Reviewed s/s hypoglycemia and treatment of same. Advised to carry a source of fast acting carbs at all times     Chronic  Complications (preventing, detecting, and treating chronic complications): Discussion, Comprehends Key Points, Written Materials Provided  Clinical (diabetes, other pertinent medical history, and relevant comorbidities reviewed during visit): Discussion, Comprehends Key Points, Written Materials Provided  Cognitive (knowledge of self-management skills, functional health literacy): Discussion, Comprehends Key Points, Written Materials Provided  Psychosocial (emotional response to diabetes): Not Covered/Deferred(time constraints)  Diabetes Distress and Support Systems: Not Covered/Deferred(time constraints)  Behavioral (readiness for change, lifestyle practices, self-care behaviors): Discussion, Comprehends Key Points    Goals  Patient has selected/evaluated goals during today's session: Yes, selected  Monitoring: (continue to monitor 4 x day; goal to keep bg under 200 within next 2 weeks)    Diabetes Meal Plan  Restrictions: Restricted Carbohydrate  Carbohydrate Per Meal: 30-45g  Carbohydrate Per Snack : 15-20g    Today's Self-Management Care Plan was developed with the patient's input and is based on barriers identified during today's assessment.    The long and short-term goals in the care plan were written with the patient/caregiver's input. The patient has agreed to work toward these goals to improve his overall diabetes control.      The patient received a copy of today's self-management plan and verbalized understanding of the care plan, goals, and all of today's instructions.      The patient was encouraged to communicate with his physician and care team regarding his condition(s) and treatment.  I provided the patient with my contact information today and encouraged him to contact me via phone or patient portal as needed.     Education Units of Time   Time Spent: 60 min

## 2020-03-04 NOTE — TELEPHONE ENCOUNTER
Called patient to discuss BG log as we gave him differing doses of insulin than what is recorded on log. He reports he is taking Lantus 20 units daily in AM and Humalog 20 units before meals + SSI isf 25/150. BGs are ranging 200-400. He reports he stopped drinking 4-2L cokes daily and is now drinking crystal light and a 16 oz coke daily. Discussed BGs are still persistently high. We will increase Lantus to 30 units daily and Humalog 24 units + SSI. He agrees to bring his blood sugar log with him to clinic tomorrow for diabetes education

## 2020-03-05 ENCOUNTER — CLINICAL SUPPORT (OUTPATIENT)
Dept: DIABETES | Facility: CLINIC | Age: 55
End: 2020-03-05
Payer: MEDICAID

## 2020-03-05 DIAGNOSIS — E11.65 UNCONTROLLED TYPE 2 DIABETES MELLITUS WITH HYPERGLYCEMIA: Chronic | ICD-10-CM

## 2020-03-05 PROCEDURE — 99999 PR PBB SHADOW E&M-EST. PATIENT-LVL I: ICD-10-PCS | Mod: PBBFAC,,,

## 2020-03-05 PROCEDURE — 99211 OFF/OP EST MAY X REQ PHY/QHP: CPT | Mod: PBBFAC

## 2020-03-05 PROCEDURE — G0108 DIAB MANAGE TRN  PER INDIV: HCPCS | Mod: PBBFAC

## 2020-03-05 PROCEDURE — 99999 PR PBB SHADOW E&M-EST. PATIENT-LVL I: CPT | Mod: PBBFAC,,,

## 2020-03-05 NOTE — TELEPHONE ENCOUNTER
In the following note references to Epclusa indicate the authorized generic for Epclusa - aka AG Epclusa - sofosbuvir-velpatasvir 400-100mg tab.     Initial Epclusa consult completed on 3/5 with patient at [facility] at which time medication was received in person.  Patient will start Epclusa on 3/6. Address confirmed, CC on file. Confirmed 2 patient identifiers - name and . Therapy Appropriate.     Epclusa 400/100mg- Take one tablet by mouth daily x 12 weeks  Counseling was reviewed:   1. Patient MUST take Epclusa at the SAME time every day.   2. Patient MUST avoid acid reducers without consulting with myself or provider first. Antacids are to be spaced out at least 4 hours apart from Epclusa.      3. Potential Side effects include: nausea, headaches, insomnia and fatigue.   Headache: Patient may treat with OTC remedies. If Tylenol is used, dose should not exceed 2000mg per day.    4. Medication list reviewed. No DDIs or allergies noted. Patient MUST contact myself or provider prior to starting any new OTC, herbal, or prescription drugs to avoid potential DDIs.    DDI: Medication list reviewed and potential DDIs addressed.    Discussed the importance of staying well hydrated while on therapy. Compliance stressed - patient to take missed doses as soon as remembered, but NOT to take 2 doses in one day. Patient will report questions or concerns to myself or practitioner. Patient verbalizes understanding. Patient plans to start Epclusa on 3/6.  Consultation included: indication; goals of treatment; administration; storage and handling; side effects; how to handle side effects; the importance of compliance; how to handle missed doses; the importance of laboratory monitoring; the importance of keeping all follow up appointments.  Patient understands to report any medication changes to [facility] and provider. All questions answered and addressed to patients satisfaction. I will f/u with her in 1 week from start, [facility] to contact  patient in 3 weeks for refills.     Nicholas Saavedra, REMY.Ph., AAHIVP  Clinical Pharmacist, HIV/HCV  [facility]  Phone: [phone]

## 2020-03-08 ENCOUNTER — TELEPHONE (OUTPATIENT)
Dept: HEPATOLOGY | Facility: CLINIC | Age: 55
End: 2020-03-08

## 2020-03-08 DIAGNOSIS — B18.2 CHRONIC HEPATITIS C WITHOUT HEPATIC COMA: Primary | ICD-10-CM

## 2020-03-08 NOTE — TELEPHONE ENCOUNTER
Pt beginning 12 weeks of Epclusa on 3/6/20  Aruna 2, sof+rbv failure  F4    Pls schedule:  Keep April u/s and visit w/ me  Can consolidate 4/2020 cirrhosis / HCC screening labs w/ week 6 labs if he'd like  - HCV RNA at week 6  - CMP, HCV RNA - [id number]

## 2020-03-10 ENCOUNTER — EPISODE CHANGES (OUTPATIENT)
Dept: HEPATOLOGY | Facility: CLINIC | Age: 55
End: 2020-03-10

## 2020-03-10 NOTE — TELEPHONE ENCOUNTER
Attempted to contact pt by phone. No answer. VM left stating change and 4/23 labs. Awaiting call back. Pt labs scheduled as instructed. Reminder notices mailed to pt.

## 2020-03-13 ENCOUNTER — TELEPHONE (OUTPATIENT)
Dept: PHARMACY | Facility: CLINIC | Age: 55
End: 2020-03-13

## 2020-03-13 NOTE — TELEPHONE ENCOUNTER
Initial touch base conducted for Epclusa - Name/ confirmed. Confirmed patient started medication as instructed on 3/6. Patient confirms that they are taking Epclusa every day at 5:30 am. Patient denies skipping or missing doses.  Patient reports experiencing nausea but is tolerable. Patient was advised to ensure taking the medication with food to help with nausea. If nausea does not subside or gets worse, patient advised to call [facility] or clinic. Patient verbalized understanding. Patient reports no new medications, otc remedies, or allergies. Patient reminded of lab appointments.  Patient counseled on importance of maintaining adherence and keeping lab appointments which were scheduled. Advised to call [facility] and provider if any issues arise.  No questions or concerns. Aware [facility] will call for refills when patient has 7 days of medication on hand.

## 2020-03-26 ENCOUNTER — TELEPHONE (OUTPATIENT)
Dept: PHARMACY | Facility: CLINIC | Age: 55
End: 2020-03-26

## 2020-03-26 ENCOUNTER — TELEPHONE (OUTPATIENT)
Dept: ENDOCRINOLOGY | Facility: CLINIC | Age: 55
End: 2020-03-26

## 2020-03-26 NOTE — TELEPHONE ENCOUNTER
----- Message from Gisella Stovall sent at 3/26/2020  1:22 PM CDT -----  Contact: [facility] [phone]  Needs chart notes from pts visit on 2/11/2020    Please fax to     [phone]

## 2020-03-26 NOTE — TELEPHONE ENCOUNTER
Epclusa refill (2 of 3) confirmed and reassessment complete. We will ship Epclusa refill on 3/30 via fedex to arrive on 3/31. $0.00 copay- 004. Confirmed 2 patient identifiers - name and . Therapy appropriate.     Patient has 11 doses of Epclusa remaining and takes it around 5:30 am daily.  Patient stated he started the medication on Monday, 3/9 (not 3/6 like previously reported) - he confirms he has NOT missed any doses. [facility] stressed the importance of adherence. Patient verbalized understanding. Pt reports they are not having any side effects so far. No missed doses, no new medications, no new allergies or health conditions reported at this time. Allergies reviewed and medication reconciliation complete (reviewed and documented in [facility] and [facility]). Disease education reviewed (including transmission and prevention). Patient counseled on importance of keeping lab appointments which were scheduled. All questions answered and addressed to patients satisfaction. Advised to call [facility] and provider if any issues arise.  Pt verbalized understanding.

## 2020-04-07 ENCOUNTER — TELEPHONE (OUTPATIENT)
Dept: HEPATOLOGY | Facility: CLINIC | Age: 55
End: 2020-04-07

## 2020-04-07 ENCOUNTER — TELEPHONE (OUTPATIENT)
Dept: RADIOLOGY | Facility: HOSPITAL | Age: 55
End: 2020-04-07

## 2020-04-07 NOTE — TELEPHONE ENCOUNTER
pls contact pt:  - need to r/s u/s per radiology - see below  - can cancel wk 6 labs due to COVID19 pandemic restrictions  - r/s labs to date of u/s in May    Can change April visit to video visit or schedule in person May visit.

## 2020-04-07 NOTE — TELEPHONE ENCOUNTER
----- Message from RT Chai sent at 4/7/2020  3:50 PM CDT -----  Contact: Georgette Del Rio  As instructed by Radiology Leadership on 3/20/20, Patient Nicholas Marshall's Ultrasound exam was to be rescheduled after 5/4/2020.  He has been contacted twice with voicemail's left to call [phone] to reschedule due to COVID-19 event.    Please have your nurse or MA reschedule in [facility] after 5/4/2020, when it is convenient for the patient.  If there are any questions, please call the Imaging Center at extension [phone].

## 2020-04-08 ENCOUNTER — EPISODE CHANGES (OUTPATIENT)
Dept: HEPATOLOGY | Facility: CLINIC | Age: 55
End: 2020-04-08

## 2020-04-08 NOTE — TELEPHONE ENCOUNTER
Attempt made to reach patient.  LVM informing him because of corona virus restrictions, lab and ultrasound appointments had to be moved to May and that we could setup a video visit after testing complete with PA Scheuermann.  Yarely spoke with patient and he reported that he moved to [place]. Orders mailed to him to have all testing orded by PA Scheuermann done locally.  Episode updated.

## 2020-04-17 ENCOUNTER — OFFICE VISIT (OUTPATIENT)
Dept: ENDOCRINOLOGY | Facility: CLINIC | Age: 55
End: 2020-04-17
Payer: MEDICAID

## 2020-04-17 DIAGNOSIS — E11.65 UNCONTROLLED TYPE 2 DIABETES MELLITUS WITH HYPERGLYCEMIA: Primary | ICD-10-CM

## 2020-04-17 DIAGNOSIS — E11.65 UNCONTROLLED TYPE 2 DIABETES MELLITUS WITH HYPERGLYCEMIA, WITH LONG-TERM CURRENT USE OF INSULIN: ICD-10-CM

## 2020-04-17 DIAGNOSIS — Z79.4 UNCONTROLLED TYPE 2 DIABETES MELLITUS WITH HYPERGLYCEMIA, WITH LONG-TERM CURRENT USE OF INSULIN: ICD-10-CM

## 2020-04-17 PROCEDURE — 99211 OFF/OP EST MAY X REQ PHY/QHP: CPT | Mod: 95,,, | Performed by: INTERNAL MEDICINE

## 2020-04-17 PROCEDURE — 99211 PR OFFICE/OUTPT VISIT, EST, LEVL I: ICD-10-PCS | Mod: 95,,, | Performed by: INTERNAL MEDICINE

## 2020-04-17 RX ORDER — INSULIN GLARGINE 100 [IU]/ML
40 INJECTION, SOLUTION SUBCUTANEOUS DAILY
Qty: 5 ML | Refills: 6 | Status: SHIPPED | OUTPATIENT
Start: 2020-04-17

## 2020-04-17 RX ORDER — INSULIN LISPRO 100 [IU]/ML
INJECTION, SOLUTION INTRAVENOUS; SUBCUTANEOUS
Qty: 4 BOX | Refills: 3 | Status: SHIPPED | OUTPATIENT
Start: 2020-04-17

## 2020-04-17 NOTE — PROGRESS NOTES
"Established Patient Telehealth Visit      This was a telemedicine visit which took place via telephone.       The reason for the telephone service rather than a virtual visit was: patient did not have access to internet or smart phone.      During the visit, I was located in [place] and the patient was located in [place] and I had access to and was able to review their medical record.     The patient has been informed that they have chosen to received care through the use of telemedicine. Telemedicine enables to healthcare providers at different locations to provide safe, effective, and convenient care through the use of technology.  There risks associated with use of telemedicine, including equipment failures.  They also understand that I cannot physically examine them.      Patient consented to the use of telemedicine in their medical care.    Patient verbally understands the risks and benefits of telemedicine as explained.      All questions regarding telemedicine were answered.      Reason for call:  management T2DM    Previously seen by Lia Olguin, new to me    HPI:   Diagnosed with Type 2 DM in 2013   Began treatment of orals. Began insulin therapy in 2014.   Reports dietary changes after dx allowed him 100#.  Follows with hepatology for + HCV (1980s).   He is now on disability.      Family History of Type 2 DM none. Sister is "borderline"  Known complications:   DKA/HHS: none  RN: needs eye exam  PN: +   Nephropathy: unknown, needs urine  Gastroparesis: denies  CAD: denies    Since his last visit Lia has made changes to insulin doses however he continues to use Lantus 40 units daily as well as Humalog 40 units with each meal despite being told by both Lia and Vane Pierre to use different doses of these medications    Now living with his daughter.  Patient states that he is taking insulin appropriately and not drinking high sugar drinks however his daughter states that he is drinking Coke at " "least once daily and she feels his diet is poor    Currently taking:  Lantus 40 units  Humalog 40 units but will take both with and without meals a few times day    Checking sugars before and after eating- all numbers in 300's  Denies hypoglycemia     Dietary recall: He reports he feels that he is not following diabetic diet.   Eats 3 meals a day.   B: oatmeal with sugar and banana or toast; likes eggs  L: sandwich with chips   D: meat and vegetables with potato or other carbs  Snacks: cookies, chips, donuts, candy  Drinks: coke-4 2L daily    Last education: 3/2020    Diabetes Management Status    Statin: Not taking  ACE/ARB: Not taking    Screening or Prevention Patient's value Goal Complete/Controlled?   HgA1C Testing and Control   Lab Results   Component Value Date    HGBA1C 11.2 (H) 02/11/2020      Annually/Less than 8% No   Lipid profile : 02/11/2020 Annually Yes   LDL control Lab Results   Component Value Date    LDLCALC 101.0 02/11/2020    Annually/Less than 100 mg/dl  No   Nephropathy screening Lab Results   Component Value Date    LABMICR 12.0 02/11/2020     Lab Results   Component Value Date    PROTEINUA Negative 10/24/2018    Annually Yes   Blood pressure BP Readings from Last 1 Encounters:   02/11/20 (!) 130/90    Less than 140/90 Yes   Dilated retinal exam : 10/23/2017 Annually Yes   Foot exam   : 02/11/2020 Annually Yes              Current Outpatient Medications:     aspirin (ECOTRIN) 81 MG EC tablet, Take 81 mg by mouth once daily., Disp: , Rfl:     BD ULTRA-FINE SHORT PEN NEEDLE 31 gauge x 5/16" Ndle, , Disp: , Rfl:     ergocalciferol (ERGOCALCIFEROL) 50,000 unit Cap, Take 1 capsule (50,000 Units total) by mouth every 7 days., Disp: 12 capsule, Rfl: 0    escitalopram oxalate (LEXAPRO) 5 MG Tab, Take 5 mg by mouth once daily., Disp: , Rfl:     gabapentin (NEURONTIN) 300 MG capsule, Take 1 capsule (300 mg total) by mouth every evening., Disp: 30 capsule, Rfl: 11    glucagon, human recombinant, " "(GLUCAGON EMERGENCY KIT, HUMAN,) 1 mg SolR, Inject 1 mg into the muscle as needed., Disp: 1 each, Rfl: 0    HUMALOG MIX 50-50 KWIKPEN 100 unit/mL (50-50) InPn, , Disp: , Rfl:     insulin glargine (LANTUS SOLOSTAR) 100 unit/mL (3 mL) InPn pen, Inject 20 Units into the skin once daily. , Disp: 5 mL, Rfl: 6    insulin lispro (HUMALOG KWIKPEN) 100 unit/mL InPn pen, Inject 18 units into skin with each meal plus correction scale 180-230 +2, 231-280 +4, 281-330 +6, 331-380 +8, >380 +10 as needed. (Patient taking differently: 20 Units. Inject 18 units into skin with each meal plus correction scale 180-230 +2, 231-280 +4, 281-330 +6, 331-380 +8, >380 +10 as needed.), Disp: 4 Box, Rfl: 3    insulin lispro protamin-lispro 100 unit/mL (50-50) InPn, Inject 20 Units into the skin., Disp: , Rfl:     insulin needles, disposable, (BD INSULIN PEN NEEDLE UF MINI) 31 x 3/16 " Ndle, USE DAILY WITH INSULIN PENS, Disp: 200 each, Rfl: 11    ketorolac (TORADOL) 10 mg tablet, Take 1 tablet (10 mg total) by mouth every 6 (six) hours as needed., Disp: 20 tablet, Rfl: 1    lancets Misc, BG monitoring 4 times a day. Pt uses one touch verio meter., Disp: 150 each, Rfl: 4    multivit with min-FA-lycopene (ONE-A-DAY MEN'S) 0.4-600 mg-mcg Tab, Take by mouth., Disp: , Rfl:     ONETOUCH DELICA LANCETS 33 gauge Misc, , Disp: , Rfl:     oxyCODONE-acetaminophen (PERCOCET)  mg per tablet, Take 1 tablet by mouth every 4 (four) hours as needed for Pain. Causes drowsiness. Do not drive or operate machinery with this medication. Do not drink alcohol with this medication. Causes constipation. Take with stool softener. (Patient not taking: Reported on 3/5/2020), Disp: 8 tablet, Rfl: 0    sofosbuvir-velpatasvir 400-100 mg Tab, Take 1 tablet by mouth once daily., Disp: 28 tablet, Rfl: 2    TESTOSTERONE 5 MG CAP, Take by mouth once daily., Disp: , Rfl:     TRUE METRIX GLUCOSE TEST STRIP Strp, , Disp: , Rfl:     Assessment and plan:    55 y.o.M " with poorly controlled T2DM, cirrhosis    He reports majority of glucoses in the 300-400s  Suspect this is due to dietary indiscretion as well as not taking insulin appropriate times and likely omitted doses  He is now living with his daughter who can help him with both checking glucose and taking insulin appropriate times.  She will also help work on diet and eliminating sugary drinks    New DM regimen:  Lantus 40 units once daily  Humalog 30 units t.i.d. with meals.  Discussed that insulin should be taken 10-15 minutes before meal and that he does not eat he should not take Humalog    His daughter will keep a log of his sugars and doses and sent to me in one week for review and further titration    He will need ongoing education at I suspect health literacy is poor and he is struggling to take care of diabetes appropriately      The session was 11-20 minutes of medical discussion.      This service was not originating from a related E/M service provided within the previous 7 days nor did  to an E/M service or procedure within the next 24 hours or my soonest available appointment.  Prevailing standard of care was able to be met in this audio-only visit.

## 2020-04-20 ENCOUNTER — PATIENT MESSAGE (OUTPATIENT)
Dept: ENDOCRINOLOGY | Facility: CLINIC | Age: 55
End: 2020-04-20

## 2020-04-20 ENCOUNTER — TELEPHONE (OUTPATIENT)
Dept: ENDOCRINOLOGY | Facility: CLINIC | Age: 55
End: 2020-04-20

## 2020-04-20 NOTE — TELEPHONE ENCOUNTER
Spoke with patient daughter she is worried about her fathers sugars dropping this happen this morning while patient was working in the yard patient given a coke and chocolate to bring sugar up daughter is worried and would like to speak with provider regarding this please advise.

## 2020-04-20 NOTE — TELEPHONE ENCOUNTER
Daughter states she will send blood sugar logs along w/ insulin dosages via patient portal.  Daughter would like to know from provider when should the Emergency kit be given.  Please advise.

## 2020-04-20 NOTE — TELEPHONE ENCOUNTER
Called 2x, no answer.  Unable to leave VM due to mailbox is full.  Will send message via patient portal

## 2020-04-20 NOTE — TELEPHONE ENCOUNTER
----- Message from Edwina Lee sent at 4/20/2020 12:51 PM CDT -----  Contact: Pt Daughter - Jimmy Marshall   Pt awaiting prescription for insulin lispro (HUMALOG KWIKPEN INSULIN) 100 unit/mL pen sent over to pharmacy. Pt was advised prescription showing sent on the 17th of April to    [id number] [place] [address] [id number] Pt daughter also requesting call back with her assisting her dad until prescription is filled. Stated her dad sugar level was 60 then 84 and went down to 70 before phone line disconnected.    [phone] (home)

## 2020-04-27 ENCOUNTER — TELEPHONE (OUTPATIENT)
Dept: PHARMACY | Facility: CLINIC | Age: 55
End: 2020-04-27

## 2020-04-27 NOTE — TELEPHONE ENCOUNTER
AG Epclusa refill (3 of 3) confirmed and reassessment complete. We will ship AG Epclusa refill on  via fedex to arrive on . $0 copay- 004. Confirmed 2 patient identifiers - name and . Therapy appropriate.     Patient has 6 doses of AG Epclusa remaining and takes it around 5 AM daily.  Pt reports they are not having any side effects so far. No missed doses, no new medications, no new allergies or health conditions reported at this time. Allergies reviewed and medication reconciliation complete (reviewed and documented in [facility] and [facility]).  Disease education reviewed (including transmission and prevention). Patient counseled on importance of maintaining adherence and keeping lab appointments which were scheduled. All questions answered and addressed to patients satisfaction. Advised to call [facility] and provider if any issues arise.  Pt verbalized understanding.

## 2020-04-30 ENCOUNTER — TELEPHONE (OUTPATIENT)
Dept: HEPATOLOGY | Facility: CLINIC | Age: 55
End: 2020-04-30

## 2020-05-04 ENCOUNTER — EPISODE CHANGES (OUTPATIENT)
Dept: HEPATOLOGY | Facility: CLINIC | Age: 55
End: 2020-05-04

## 2020-05-04 ENCOUNTER — TELEPHONE (OUTPATIENT)
Dept: HEPATOLOGY | Facility: CLINIC | Age: 55
End: 2020-05-04

## 2020-05-04 LAB
EXT AFP: 4 NG/ML
EXT ALBUMIN: 3.9
EXT ALKALINE PHOSPHATASE: 69
EXT ALT: 25
EXT ANION GAP: 8
EXT AST: 32
EXT BASO #: 0.07
EXT BASOPHIL%: 0.7
EXT BILIRUBIN TOTAL: 0.7
EXT BUN: 10.58
EXT CALCIUM: 9.32
EXT CHLORIDE: 107
EXT CO2: 28
EXT CREATININE: 0.72 MG/DL
EXT EGFR IF AFRICAN AMERICAN: >60
EXT EGFR IF NON AFRICAN AMERICAN: >60
EXT EOS #: 0.23
EXT EOSINOPHIL%: 2.2
EXT GLUCOSE: 71
EXT HCV RNA QUANT PCR: <10 IU/ML
EXT HEMATOCRIT: 47.8
EXT HEMOGLOBIN: 16.3
EXT LYMPH #: 2.88
EXT LYMPH%: 27.7
EXT MCH: 30.8
EXT MCHC: 34.1
EXT MCV: 90.2
EXT MONO #: 0.66
EXT MONOCYTES%: 6.3
EXT NEUT%: 62.8
EXT NEUTROPHILS (ABSOLUTE): 6.54
EXT PLATELETS: 214
EXT POTASSIUM: 3.71
EXT PROTEIN TOTAL: 7.4
EXT PT: 15.3
EXT RBC: 5.3
EXT RDW: 12.4
EXT SODIUM: 143 MMOL/L
EXT WBC: 10.41
IMMATURE GRANULOCYTES/100 LEUKOCYTES: 0.3
IMMATURE GRANULOCYTES: 0.03
INR PPP: 1.4 (ref 2–3)
NRBC %: 0
NRBC: 0

## 2020-05-04 NOTE — TELEPHONE ENCOUNTER
HCV LAB REVIEW  Week 8 of Epclusa, planning on 12 weeks treatment  Aruna 2, sof+rbv failure  F4    ([place] pt, has visit w/ Dr Wagoner 5/28/20)    Pertinent labs:  4/29/20  AFP 4  CBC stable  CMP stable, LFT normal  INR 1.4  HCV <10    pls call pt:  - Labs look good:   Liver cancer tumor marker is normal.  Liver enzymes now normal.   HCV is now negative in blood. Very important to continue treatment since it is likely still in the liver.  Liver appears to be working fine  - Continue Epclusa - 1 pill daily - don't miss any doses.  - Should be finishing treatment in a few weeks.  - There is a small chance the virus can return during the 3 months after treatment ends. We will monitor blood for this.     **Is he scheduled for u/s for HCC screening?? This is due now**    Next labs to see if Hep C is cured are due: 8/20/20

## 2020-05-05 ENCOUNTER — TELEPHONE (OUTPATIENT)
Dept: HEPATOLOGY | Facility: CLINIC | Age: 55
End: 2020-05-05

## 2020-05-05 NOTE — TELEPHONE ENCOUNTER
I spoke with patient and msg from PA Scheuermann relayed.  August lab draw already setup.  US report placed in red folder for PA Scheuermann's review.

## 2020-05-05 NOTE — TELEPHONE ENCOUNTER
MA called patient convert his appt to video visit due to MD not going to [place] on 5/28 he understood schedule his appt as video visit.

## 2020-05-08 NOTE — TELEPHONE ENCOUNTER
U/S abd 4/29/20 - no liver lesions    Tell pt u/s looked okay.  Liver cancer screening now up to date.  pls schedule CBC, CMP, INR, AFP, U/S, VISIT (in person or video) - 10/2020

## 2020-05-11 NOTE — TELEPHONE ENCOUNTER
I spoke with patient and msg from PA Scheuermann relayed.  Patient scheduled to have a virtual visit with  Dr. Wagoner on 5/28/20.

## 2020-05-28 ENCOUNTER — OFFICE VISIT (OUTPATIENT)
Dept: HEPATOLOGY | Facility: CLINIC | Age: 55
End: 2020-05-28
Payer: MEDICAID

## 2020-05-28 ENCOUNTER — TELEPHONE (OUTPATIENT)
Dept: HEPATOLOGY | Facility: CLINIC | Age: 55
End: 2020-05-28

## 2020-05-28 DIAGNOSIS — K74.69 OTHER CIRRHOSIS OF LIVER: Primary | ICD-10-CM

## 2020-05-28 DIAGNOSIS — B18.2 CHRONIC HEPATITIS C WITHOUT HEPATIC COMA: Chronic | ICD-10-CM

## 2020-05-28 PROCEDURE — 99214 PR OFFICE/OUTPT VISIT, EST, LEVL IV, 30-39 MIN: ICD-10-PCS | Mod: 95,,, | Performed by: INTERNAL MEDICINE

## 2020-05-28 PROCEDURE — 99214 OFFICE O/P EST MOD 30 MIN: CPT | Mod: 95,,, | Performed by: INTERNAL MEDICINE

## 2020-05-28 NOTE — PROGRESS NOTES
"HEPATOLOGY FOLLOW UP    THIS IS A VIDEO VISIT, THEREFORE, SOME ELEMENTS OF THE PHYSICAL EXAM, SUCH AS VITAL SIGNS, HEART SOUNDS OR BREATH SOUNDS ARE NOT INCLUDED.  ANY SYMPTOMS OR SIGNS THAT WERE VISUALIZED OR STATED BY THE PATIENT MAY BE INCLUDED IN THIS NOTE.  Patient joined video visit at 8:18 AM.       Nicholas Marshall is here for follow up of Cirrhosis and Hepatitis C      HPI  55 y.o. male with   Chief Complaint   Patient presents with    Cirrhosis    Hepatitis C     Patient with known DM, HTN, and prior:     -  Hep C rey 2a/c infection, initial relapse after sof+riba treatment, subsequently on epclusa x 12 weeks, he has 5 more days of treatment left to take.   SVR12 expected to be checked on 8/20/2020.     -  Has had cirrhosis which is under HCC surveillance, last updated on 4/29/2020 with neg US and AFP 10/2019.  Now on video visit for routine visit.      -  Today, offers no complaints.         Outpatient Encounter Medications as of 5/28/2020   Medication Sig Dispense Refill    aspirin (ECOTRIN) 81 MG EC tablet Take 81 mg by mouth once daily.      escitalopram oxalate (LEXAPRO) 5 MG Tab Take 5 mg by mouth once daily.      insulin (LANTUS SOLOSTAR U-100 INSULIN) glargine 100 units/mL (3mL) SubQ pen Inject 40 Units into the skin once daily. 5 mL 6    insulin lispro (HUMALOG KWIKPEN INSULIN) 100 unit/mL pen Inject 30 units into skin with each meal plus correction scale 180-230 +2, 231-280 +4, 281-330 +6, 331-380 +8, >380 +10 as needed. 4 Box 3    ketorolac (TORADOL) 10 mg tablet Take 1 tablet (10 mg total) by mouth every 6 (six) hours as needed. 20 tablet 1    multivit with min-FA-lycopene (ONE-A-DAY MEN'S) 0.4-600 mg-mcg Tab Take by mouth.      sofosbuvir-velpatasvir 400-100 mg Tab Take 1 tablet by mouth once daily. 28 tablet 2    TESTOSTERONE 5 MG CAP Take by mouth once daily.      BD ULTRA-FINE SHORT PEN NEEDLE 31 gauge x 5/16" Ndle       gabapentin (NEURONTIN) 300 MG capsule Take 1 " "capsule (300 mg total) by mouth every evening. 30 capsule 11    glucagon, human recombinant, (GLUCAGEN HYPOKIT) 1 mg SolR Inject 1 mg into the muscle as needed. (Patient not taking: Reported on 5/28/2020) 1 each 3    insulin needles, disposable, (BD INSULIN PEN NEEDLE UF MINI) 31 x 3/16 " Ndle USE DAILY WITH INSULIN PENS (Patient not taking: Reported on 5/28/2020) 200 each 11    lancets Misc BG monitoring 4 times a day. Pt uses one touch verio meter. (Patient not taking: Reported on 5/28/2020) 150 each 4    ONETOUCH DELICA LANCETS 33 gauge Misc       TRUE METRIX GLUCOSE TEST STRIP Strp        No facility-administered encounter medications on file as of 5/28/2020.      Review of patient's allergies indicates:  No Known Allergies  Past Medical History:   Diagnosis Date    Diabetes mellitus     Diabetes mellitus type 2 in nonobese 9/16/2013    Hepatitis C virus infection 9/16/2013    Hypertension     Obesity 9/16/2013    Intentional 75 ib weight loss    Splenomegaly     Staph infection     in rectum from seafood     Past Surgical History:   Procedure Laterality Date    CHOLECYSTECTOMY      2003    COLONOSCOPY N/A 1/25/2016    Procedure: COLONOSCOPY;  Surgeon: Freddy Almendarez MD;  Location: [facility] ([address] [place] [address]);  Service: Endoscopy;  Laterality: N/A;    inguinal hernia  2001    left     Social History     Socioeconomic History    Marital status:      Spouse name: Not on file    Number of children: Not on file    Years of education: Not on file    Highest education level: Not on file   Occupational History     Employer: [employer]   Social Needs    Financial resource strain: Not on file    Food insecurity:     Worry: Not on file     Inability: Not on file    Transportation needs:     Medical: Not on file     Non-medical: Not on file   Tobacco Use    Smoking status: Former Smoker     Types: Vaping with nicotine, Vaping w/o nicotine    Smokeless tobacco: Former User     Types: Snuff "   Substance and Sexual Activity    Alcohol use: No     Alcohol/week: 0.0 standard drinks     Comment: occ    Drug use: No    Sexual activity: Yes     Partners: Female   Lifestyle    Physical activity:     Days per week: Not on file     Minutes per session: Not on file    Stress: Not on file   Relationships    Social connections:     Talks on phone: Not on file     Gets together: Not on file     Attends Hoahaoism service: Not on file     Active member of club or organization: Not on file     Attends meetings of clubs or organizations: Not on file     Relationship status: Not on file   Other Topics Concern    Not on file   Social History Narrative    Not on file       ROS  Gen- no wt loss, gain, fever, chills  HEENT - no congestion, nosebleed, visual or hearing problems  Chest - no cough, shortness of breath, chest pain  Cardiovascular- no chest pain, leg swelling, dyspnea on exertion or at rest, orthopnea  GI- no abdominal pain, nausea, vomiting, constipation, has a little diarrhea   Musculoskeletal:  no pain or swelling of joints  Neuro - no headaches, dizziness, weakness, tingling numbness in hands or feet,  Skin- bruises with insulin shots, no rash, itching  Psych - no depression, anxiety, sleep disturbance, memory loss    There were no vitals filed for this visit. due to video visit.    Physical Exam:  General Appearance: Well appearing in no acute distress  Head:   Normocephalic, without obvious abnormality  Eyes:    No scleral icterus, EOMI  ENT: Lungs: Heart:  Not examined due to video visit  Abdomen: Soft, non tender, non distended per patient's own observation.  On profile view abd not distended  Extremities: 2+ pulses, no clubbing, cyanosis or edema  Skin: No rash  Neurologic: Alert, oriented x 3, no asterixis.     Labs reviewed in hep C provider note, transaminases normalized. Synthetic function near normal.      Diagnostics: as above     Assessment   1. Other cirrhosis of liver    2. Chronic  hepatitis C without hepatic coma      -  Hep C is on treatment with Epclusa, last 5 days of pills remaining to take.  SVR12 to be checked in August 2020.    -  Cirrhosis well-compensated, with neg HCC surveillance as of 4/2020.      -  Needs to continue HCC surveillance q 6 months with AFP and U/S abd limited, next due 10/2020.     Plan:  -  SVR12 expected to be checked on 8/20/2020.   -  CBC, CMP, PT INR every 6 months, next due in 10/2020  -  Ultrasound of abdomen and AFP every 6 months, next due in 10/2020   -  Continue current meds  -  Avoid alcohol, smoking, sedatives and meds with codeine.  -  Avoid high intake of Tylenol (more than 4 extra-strength pills in one day)  -  Call us if any bleeding, fevers, confusion, disorientation occur  -  Endoscopy: to be done by local GI MD  -  Transplant option discussed, will evaluate when MELD >15.  -  Change primary phone to: [phone] - this is to be used to call . The other phone [phone] is daughter, Suzette scott number as back-up.   -  Return in 1 year.

## 2020-05-28 NOTE — PATIENT INSTRUCTIONS
Plan:  -  SVR12 expected to be checked on 8/20/2020.   -  CBC, CMP, PT INR every 6 months, next due in 10/2020  -  Ultrasound of abdomen and AFP every 6 months, next due in 10/2020   -  Change primary phone to: [phone] - this is to be used to call . The other phone [phone] is daughter, Suzette keep number as back-up.   -  Continue current meds  -  Avoid alcohol, smoking, sedatives and meds with codeine.  -  Avoid high intake of Tylenol (more than 4 extra-strength pills in one day)  -  Call us if any bleeding, fevers, confusion, disorientation occur  -  Endoscopy: to be done by local GI MD  -  Transplant option discussed, will evaluate when MELD >15.  -  Return in 1 year.

## 2020-05-28 NOTE — TELEPHONE ENCOUNTER
MA tried contacting the pt to help him get on his video visit. The pt didn't answer and the mailbox is full.

## 2020-05-28 NOTE — Clinical Note
Plan:-  SVR12 expected to be checked on 8/20/2020. -  CBC, CMP, PT INR every 6 months, next due in 10/2020-  Ultrasound of abdomen and AFP every 6 months, next due in 10/2020 -  Continue current meds-  Avoid alcohol, smoking, sedatives and meds with codeine.-  Avoid high intake of Tylenol (more than 4 extra-strength pills in one day)-  Call us if any bleeding, fevers, confusion, disorientation occur-  Endoscopy: to be done by local GI MD-  Transplant option discussed, will evaluate when MELD >15.-  Change primary phone to: [phone] - this is to be used to call . The other phone [phone] is daughterSuzette number as back-up. -  Return in 1 year.

## 2020-06-01 ENCOUNTER — EPISODE CHANGES (OUTPATIENT)
Dept: HEPATOLOGY | Facility: CLINIC | Age: 55
End: 2020-06-01

## 2020-06-01 ENCOUNTER — TELEPHONE (OUTPATIENT)
Dept: HEPATOLOGY | Facility: CLINIC | Age: 55
End: 2020-06-01

## 2020-06-01 NOTE — TELEPHONE ENCOUNTER
----- Message from Darling Wagoner MD sent at 5/28/2020  8:34 AM CDT -----  Plan:  -  SVR12 expected to be checked on 8/20/2020.   -  CBC, CMP, PT INR every 6 months, next due in 10/2020  -  Ultrasound of abdomen and AFP every 6 months, next due in 10/2020   -  Continue current meds  -  Avoid alcohol, smoking, sedatives and meds with codeine.  -  Avoid high intake of Tylenol (more than 4 extra-strength pills in one day)  -  Call us if any bleeding, fevers, confusion, disorientation occur  -  Endoscopy: to be done by local GI MD  -  Transplant option discussed, will evaluate when MELD >15.  -  Change primary phone to: [phone] - this is to be used to call . The other phone [phone] is daughter, Suzette keep number as back-up.   -  Return in 1 year.

## 2020-06-01 NOTE — TELEPHONE ENCOUNTER
Orders mailed to patient to have testing done locally on 8/20 (SVR 12), 10/15 (HCC testing) and 11/12 (SVR 24).  Patient instructions from Dr. Wagoner also mailed to him.

## 2020-08-21 ENCOUNTER — TELEPHONE (OUTPATIENT)
Dept: HEPATOLOGY | Facility: CLINIC | Age: 55
End: 2020-08-21

## 2020-08-21 DIAGNOSIS — Z86.19 HISTORY OF HEPATITIS C: ICD-10-CM

## 2020-08-21 LAB
EXT ALBUMIN: 4.1
EXT ALKALINE PHOSPHATASE: 91
EXT ALT: 24
EXT ANION GAP: 9
EXT AST: 27
EXT BILIRUBIN TOTAL: 1.1
EXT BUN: 11.23
EXT CALCIUM: 9.59
EXT CHLORIDE: 102
EXT CO2: 24
EXT CREATININE: 0.83 MG/DL
EXT EGFR IF AFRICAN AMERICAN: >60
EXT EGFR IF NON AFRICAN AMERICAN: >60
EXT GLUCOSE: 289
EXT HCV RNA QUANT PCR: <10 IU/ML
EXT POTASSIUM: 4.23
EXT PROTEIN TOTAL: 7.5
EXT SODIUM: 135 MMOL/L

## 2020-08-21 NOTE — TELEPHONE ENCOUNTER
HCV LAB REVIEW  cpmpleted 12 weeks of Epclusa, 5/2020  Aruna 2, sof+rbv failure  F4    ([place] k[place], has visit w/ Dr Wagoner 5/28/20)    Pertinent labs:  8/18/20  CMP stable, LFT normal,   HCV <10    These labs document SVR12 following successful HCV treatment with Epclusa    please tell patient:  1.) Lab test shows there is NO Hepatitis C in the blood. This means the Hepatitis C is cured!!  We do not expect the virus to return.  This does not give protection from Hepatitis C and patient could be infected again if ever exposed to the virus again.    2.) Cirrhosis is still present and patient needs monitoring of liver and liver cancer screening every 6 months for the rest of their life. This is due again in 10/2020    3.) Since Hepatitis C is no longer present, future visits will be in General Liver clinic instead of HCV clinic.       Please schedule   - HCV RNA, CBC, CMP, INR, AFP, U/S ABDOMEN - 10/2020 - THIS CAN ALL BE ORDERED UNDER DR WAGONER SINCE PT LAST SAW HER IN MAY  (it's possible it's already ordered but need to add HCV RNA)

## 2020-08-24 NOTE — TELEPHONE ENCOUNTER
Attempt made to reach patient.  Unable to LVM.  Msg from provider mailed to him.  Orders dated 10/15 resent for patient to have HCC testing done locally and HCV RNA added to draw.

## 2020-10-19 ENCOUNTER — TELEPHONE (OUTPATIENT)
Dept: HEPATOLOGY | Facility: CLINIC | Age: 55
End: 2020-10-19

## 2020-10-19 NOTE — TELEPHONE ENCOUNTER
Dr. Wagoner ordered that patient have US and labs done locally on 10/15/20.  Patient a no-show.  Attempt made to reach him.  Unable to LVM.  Orders resent with a note asking that he setup testing.

## 2020-10-23 ENCOUNTER — TELEPHONE (OUTPATIENT)
Dept: HEPATOLOGY | Facility: CLINIC | Age: 55
End: 2020-10-23

## 2020-10-23 LAB
ALBUMIN: 4
ALP ISOS SERPL LEV INH-CCNC: 83 U/L
ALT: 20
AST: 19
BILIRUBIN TOTAL: 0.6
BUN BLD-MCNC: 12 MG/DL (ref 4–21)
CALCIUM SERPL-MCNC: 9.5 MG/DL
CHLORIDE: 101
CO2 SERPL-SCNC: 27 MMOL/L
CREAT SERPL-MCNC: 0.9 MG/DL
EGFR IF AFRICAN AMERICAN: NORMAL
EST. GFR  (NON AFRICAN AMERICAN): NORMAL
GFR MDRD AF AMER: 111
GFR: 92
GLUCOSE: 479
HCT: 47
HGB: 15.9
INR PPP: 1.4 (ref 2–3)
PLATELET # BLD AUTO: 152 10*3/UL (ref 150–399)
POTASSIUM: 4.8
PROT SERPL-MCNC: 7.2 G/DL
PT: 15.4
RBC # BLD AUTO: 5.27 10*6/UL
SODIUM: 137
WBC: 8.35

## 2020-10-23 NOTE — TELEPHONE ENCOUNTER
Received a call from Belen with [facility] with a critical lab value. Glucose 479. Read back and verified.  Dr STEVE Wagoner in clinic and notified.    Patient called. He stated he did have breakfast and took his Insulin as ordered.  Patient stated I have always been running in the 400's in the AM, afternoon 300's and evening 300's.  Patient given the results of am labs.  Encouraged to maintain diet, take medication as ordered, lose wt and exercise.  You need to call your Dr today to make an appt today and let them know what your glucose was today.    Patient voiced understanding. Will call and make appt today.  Lab results from today 10/23/20 placed in the mail.

## 2020-10-27 ENCOUNTER — TELEPHONE (OUTPATIENT)
Dept: HEPATOLOGY | Facility: CLINIC | Age: 55
End: 2020-10-27

## 2020-10-27 NOTE — TELEPHONE ENCOUNTER
----- Message from Darling Wagoner MD sent at 10/26/2020  5:13 PM CDT -----  Please inform patient results are OK.

## 2020-11-02 LAB
EXT AFP: 3 NG/ML
EXT HCV RNA QUANT PCR: <10 IU/ML

## 2020-11-03 ENCOUNTER — TELEPHONE (OUTPATIENT)
Dept: HEPATOLOGY | Facility: CLINIC | Age: 55
End: 2020-11-03

## 2020-11-03 NOTE — TELEPHONE ENCOUNTER
I spoke with patient and msg from Dr. Wagoner relayed and mailed to him regarding quant and blood sugar level.

## 2020-11-03 NOTE — TELEPHONE ENCOUNTER
----- Message from Darling Wagoner MD sent at 11/3/2020  3:01 AM CST -----  Pl inform patient:  Hep C viral marker remains negative, confirming that hep C is cure.

## 2020-12-11 ENCOUNTER — PATIENT MESSAGE (OUTPATIENT)
Dept: OTHER | Facility: OTHER | Age: 55
End: 2020-12-11

## 2021-05-07 NOTE — PROGRESS NOTES
Addended by: SAVANNAH CARRERA on: 5/7/2021 02:23 PM     Modules accepted: Orders     HEPATOLOGY CLINIC VISIT NOTE - HCV clinic    REFERRING PROVIDER:No ref. provider found  OUTSIDE REFERRING PROVIDER:    CHIEF COMPLAINT: Hepatitis C - discuss treatment    HISTORY: This is a 54 y.o. White male with chronic hepatitis C cirrhosis here for initial evaluation. He has been seen in the past by Evelia Cherry and Karen Guardado. HCV is treatment experienced- Sovaldi+RBV with relapse post treatment. HCV is genotype 2a/c.     Well compensated cirrhosis. He was diagnosed by fibrosure F4 and splenomegaly. He has been lost to follow up since 2017. Pt reports losing insurance and not being able to seek care. Labs and U/S done prior to visit    U/S with no concerning findings  Labs note hyperglycemia, pt reports consistently running about 500, no recent a1c noted.     MELD-Na score: 9 at 10/21/2019  7:30 AM  MELD score: 9 at 10/21/2019  7:30 AM  Calculated from:  Serum Creatinine: 0.7 mg/dL (Rounded to 1 mg/dL) at 10/21/2019  7:30 AM  Serum Sodium: 138 mmol/L (Rounded to 137 mmol/L) at 10/21/2019  7:30 AM  Total Bilirubin: 0.8 mg/dL (Rounded to 1 mg/dL) at 10/21/2019  7:30 AM  INR(ratio): 1.3 at 10/21/2019  7:30 AM  Age: 54 years    HCV history:  Genotype 2a/c  Relapse following Sof+RBV    Cirrhosis history:  - Well compensated  - MELD score   MELD-Na score: 9 at 10/21/2019  7:30 AM  MELD score: 9 at 10/21/2019  7:30 AM  Calculated from:  Serum Creatinine: 0.7 mg/dL (Rounded to 1 mg/dL) at 10/21/2019  7:30 AM  Serum Sodium: 138 mmol/L (Rounded to 137 mmol/L) at 10/21/2019  7:30 AM  Total Bilirubin: 0.8 mg/dL (Rounded to 1 mg/dL) at 10/21/2019  7:30 AM  INR(ratio): 1.3 at 10/21/2019  7:30 AM  Age: 54 years    - HCC screening:   - U/S: due 04/2019   - AFP: WNL    (-)Portal HTN:   - EGD: not done in past, will discuss at f/u       Denies jaundice, dark urine, abdominal distention, hematemesis, melena, slowed mentation.   No abnormal skin rashes. No generalized joint pain.                   Past Medical History:    Diagnosis Date    Diabetes mellitus     Diabetes mellitus type 2 in nonobese 9/16/2013    Hepatitis C virus infection 9/16/2013    Hypertension     Obesity 9/16/2013    Intentional 75 ib weight loss    Splenomegaly     Staph infection     in rectum from seafood     Past Surgical History:   Procedure Laterality Date    CHOLECYSTECTOMY      2003    COLONOSCOPY N/A 1/25/2016    Procedure: COLONOSCOPY;  Surgeon: Freddy Almendarez MD;  Location: [facility] ([address] [place] [address]);  Service: Endoscopy;  Laterality: N/A;    inguinal hernia  2001    left     FAMILY HISTORY: Negative for liver disease    SOCIAL HISTORY:   Social History     Tobacco Use   Smoking Status Current Every Day Smoker    Types: Vaping with nicotine, Vaping w/o nicotine   Smokeless Tobacco Former User    Types: Snuff       Social History     Substance and Sexual Activity   Alcohol Use No    Alcohol/week: 0.0 standard drinks    Comment: occ       Social History     Substance and Sexual Activity   Drug Use No     ROS:   No fever, chills, weight loss, fatigue  No chest pain, palpitations, dyspnea, cough  No abdominal pain, nausea, vomiting  No headaches, visual changes  No lower extremity edema  No depression or anxiety      PHYSICAL EXAM:  Friendly White male, in no acute distress; alert and oriented to person, place and time  VITALS: reviewed  HEENT: Sclerae anicteric.   NECK: Supple  CVS: Regular rate and rhythm. No murmurs  LUNGS: Normal respiratory effort. Clear bilaterally  ABDOMEN: Flat, soft, nontender. No organomegaly or masses. No ascites or hernias  SKIN: Warm and dry. No jaundice, No obvious rashes.   EXTREMITIES: No lower extremity edema  NEURO/PSYCH: Normal gate. Memory intact. Thought and speech pattern appropriate. Behavior normal. No depression or anxiety noted.    RECENT LABS:  Lab Results   Component Value Date    WBC 7.24 10/21/2019    HGB 14.4 10/21/2019     (L) 10/21/2019     Lab Results   Component Value Date    INR 1.3 (H)  10/21/2019     Lab Results   Component Value Date    AST 81 (H) 10/21/2019     (H) 10/21/2019    BILITOT 0.8 10/21/2019    ALBUMIN 3.7 10/21/2019    ALKPHOS 122 10/21/2019    CREATININE 0.7 10/21/2019    BUN 9 10/21/2019     10/21/2019    K 4.4 10/21/2019    AFP 4.5 10/21/2019     RECENT IMAGING:  U/S abdomen    ASSESSMENT  54 y.o. White male with:  1. CHRONIC HEPATITIS C, GENOTYPE - treatment naive  -- Prior HCV treatment -  -- Elevated transaminases  -- s/p twinrix    2. CIRRHOSIS  -- MELD score  MELD-Na score: 9 at 10/21/2019  7:30 AM  MELD score: 9 at 10/21/2019  7:30 AM  Calculated from:  Serum Creatinine: 0.7 mg/dL (Rounded to 1 mg/dL) at 10/21/2019  7:30 AM  Serum Sodium: 138 mmol/L (Rounded to 137 mmol/L) at 10/21/2019  7:30 AM  Total Bilirubin: 0.8 mg/dL (Rounded to 1 mg/dL) at 10/21/2019  7:30 AM  INR(ratio): 1.3 at 10/21/2019  7:30 AM  Age: 54 years    - HCC screening:   - U/S: due 04/2019   - AFP: WNL    (-)Portal HTN:   - EGD: not done in past, will discuss at f/u     3. UNCONTROLLED DMII  -- refer to endocrine      EDUCATION:  Discussed evidence that indicates that pt has cirrhosis.   -- Discussed the basics about liver fibrosis /scarring and how that relates to liver function. Reviewed the significance of the MELD scoring system as it relates to indication for transplant.  -- Signs and symptoms of hepatic decompensation were reviewed, including jaundice, ascites, and slowed mentation due to hepatic encephalopathy. The patient should seek medical attention if any of these things occur. We discussed the potential for bleeding from esophageal varices with symptoms of hematemesis and melena. The patient should report to the Emergency Department for these symptoms.   -- We discussed the increased risk of hepatocellular carcinoma due to cirrhosis.   Continued screening every six months with ultrasound and AFP is recommended.   -- Discussed portal hypertension, including potential development of  esophageal varices. Role of EGD in screening for varices was reviewed.   Cirrhosis education booklet given to pt    Recommended patient avoid alcohol and raw seafood. Limit tylenol to 2000mg daily    Discussed goal of HCV eradication to prevent progression of liver disease.  Discussed use of Epclusa daily x 12 weeks w/ potential side effects of fatigue and headache.     Reviewed limitations on acid suppressant medications due to DDI w/ Epclusa:  -- Antacids - must be  from Epclusa by 4 hours  -- H2 Receptor Antagonist - Pt not currently taking   Must be dosed at same time as Epclusa  -- PPI - Cannot be co administered with Epclusa. Pt will do trial of H2 blocker.     Patient instructed to contact me if experiencing additional acid related symptoms so further recommendations can be made regarding acid suppression therapy.      Herbal / alternative therapies must be discontinued      PLAN:  1. Obtain authorization to treat HCV with Epclusa daily x 12 weeks  -- Rx will be routed to [facility]  -- Patient will notify me of exact treatment start date so appropriate lab f/u can be scheduled.  2. F/u at SVR 12  3. Refer to DMII clinic     Duration of visit    With >50% of visit spent on counseling pt   [place] Truong PA-C

## 2022-08-25 NOTE — TELEPHONE ENCOUNTER
Pt referred for HCV    Lab Results   Component Value Date    WBC 8.75 06/14/2017    HGB 14.8 06/14/2017     06/14/2017    AST 53 (H) 06/14/2017    ALT 83 (H) 06/14/2017    CREATININE 0.8 06/14/2017    BILITOT 0.5 06/14/2017    ALBUMIN 3.5 06/14/2017    INR 1.2 06/14/2017    AFP 4.2 06/14/2017     MELD-Na score: 8 at 6/14/2017  7:10 AM  MELD score: 8 at 6/14/2017  7:10 AM  Calculated from:  Serum Creatinine: 0.8 mg/dL (Rounded to 1) at 6/14/2017  7:10 AM  Serum Sodium: 137 mmol/L at 6/14/2017  7:10 AM  Total Bilirubin: 0.5 mg/dL (Rounded to 1) at 6/14/2017  7:10 AM  INR(ratio): 1.2 at 6/14/2017  7:10 AM  Age: 52 years      Results for DENNIS MARR (MRN [id number]) as of 6/16/2017 14:00   Ref. Range 6/14/2017 07:10   HCV Qualitative Result Latest Ref Range: Not detected  DETECTED (A)   HCV Quantitative Log Latest Ref Range: <1.08 Log (10) IU/mL 6.11 (H)   HCV Quantitative Result Latest Ref Range: <12 IU/mL 1,295,339 (H)   Hepatitis C Virus Genotype Unknown 2a/c (A)       Please call --  Labs confirm hep C type 2 , liver function looks good and is stable   Needs abd US & clinic visit.    
DISPLAY PLAN FREE TEXT
DISPLAY PLAN FREE TEXT

## 2025-06-01 NOTE — TELEPHONE ENCOUNTER
----- Message from Evie Fraga RN sent at 10/24/2019  1:50 PM CDT -----  I spoke with patient.  He wants to have EGD done.  Please provide order.  Thanks   English

## (undated) DEVICE — SYR 10CC LUER LOCK

## (undated) DEVICE — ELECTRODE REM PLYHSV RETURN 9

## (undated) DEVICE — SEE MEDLINE ITEM 157117

## (undated) DEVICE — BLANKET UPPER BODY 78.7X29.9IN

## (undated) DEVICE — Device

## (undated) DEVICE — PAD PREP 50/CA

## (undated) DEVICE — GLOVE BIOGEL ECLIPSE SZ 7.5

## (undated) DEVICE — ELECTRODE BLADE INSULATED 1 IN

## (undated) DEVICE — GLOVE BIOGEL 7.5

## (undated) DEVICE — SEE L#120831

## (undated) DEVICE — SEE MEDLINE ITEM 157181

## (undated) DEVICE — STRIP WOUND PK BIGUANIDE 36X.5

## (undated) DEVICE — CHLORAPREP W TINT 26ML APPL

## (undated) DEVICE — NDL 18GA X1 1/2 REG BEVEL

## (undated) DEVICE — COLLECTOR SPECIMEN ANAEROBIC

## (undated) DEVICE — PAD ABD 8X10 STERILE

## (undated) DEVICE — SWAB CULTURETTE II DUAL